# Patient Record
Sex: FEMALE | Race: WHITE | NOT HISPANIC OR LATINO | Employment: FULL TIME | URBAN - METROPOLITAN AREA
[De-identification: names, ages, dates, MRNs, and addresses within clinical notes are randomized per-mention and may not be internally consistent; named-entity substitution may affect disease eponyms.]

---

## 2017-05-26 ENCOUNTER — HOSPITAL ENCOUNTER (INPATIENT)
Facility: HOSPITAL | Age: 62
LOS: 1 days | Discharge: HOME | DRG: 074 | End: 2017-05-27
Attending: EMERGENCY MEDICINE | Admitting: FAMILY MEDICINE
Payer: COMMERCIAL

## 2017-05-26 DIAGNOSIS — R10.9 ABDOMINAL PAIN: Primary | ICD-10-CM

## 2017-05-26 DIAGNOSIS — I15.2 HYPERTENSION ASSOCIATED WITH DIABETES: ICD-10-CM

## 2017-05-26 DIAGNOSIS — R50.9 FEVER, UNSPECIFIED FEVER CAUSE: ICD-10-CM

## 2017-05-26 DIAGNOSIS — E11.59 HYPERTENSION ASSOCIATED WITH DIABETES: ICD-10-CM

## 2017-05-26 DIAGNOSIS — I10 BENIGN ESSENTIAL HTN: ICD-10-CM

## 2017-05-26 DIAGNOSIS — R11.2 INTRACTABLE VOMITING WITH NAUSEA, UNSPECIFIED VOMITING TYPE: ICD-10-CM

## 2017-05-26 DIAGNOSIS — E11.65 TYPE 2 DIABETES MELLITUS WITH HYPERGLYCEMIA, WITHOUT LONG-TERM CURRENT USE OF INSULIN: ICD-10-CM

## 2017-05-26 PROBLEM — E11.10 DIABETIC KETOACIDOSIS ASSOCIATED WITH TYPE 2 DIABETES MELLITUS: Status: ACTIVE | Noted: 2017-05-26

## 2017-05-26 PROBLEM — E78.5 HLD (HYPERLIPIDEMIA): Status: ACTIVE | Noted: 2017-05-26

## 2017-05-26 PROBLEM — E11.9 DM (DIABETES MELLITUS), TYPE 2: Status: ACTIVE | Noted: 2017-05-26

## 2017-05-26 LAB
ALBUMIN SERPL BCP-MCNC: 4.3 G/DL
ALP SERPL-CCNC: 49 U/L
ALT SERPL W/O P-5'-P-CCNC: 32 U/L
AMPHET+METHAMPHET UR QL: NEGATIVE
AMYLASE SERPL-CCNC: 66 U/L
ANION GAP SERPL CALC-SCNC: 13 MMOL/L
AST SERPL-CCNC: 27 U/L
BARBITURATES UR QL SCN>200 NG/ML: NEGATIVE
BASOPHILS # BLD AUTO: 0.02 K/UL
BASOPHILS NFR BLD: 0.3 %
BENZODIAZ UR QL SCN>200 NG/ML: NEGATIVE
BILIRUB SERPL-MCNC: 0.4 MG/DL
BILIRUB UR QL STRIP: NEGATIVE
BUN SERPL-MCNC: 21 MG/DL
BZE UR QL SCN: NEGATIVE
CALCIUM SERPL-MCNC: 9.8 MG/DL
CANNABINOIDS UR QL SCN: NEGATIVE
CHLORIDE SERPL-SCNC: 104 MMOL/L
CK SERPL-CCNC: 104 U/L
CLARITY UR: CLEAR
CO2 SERPL-SCNC: 25 MMOL/L
COLOR UR: YELLOW
CREAT SERPL-MCNC: 0.7 MG/DL
CREAT UR-MCNC: 38.3 MG/DL
DIFFERENTIAL METHOD: ABNORMAL
EOSINOPHIL # BLD AUTO: 0 K/UL
EOSINOPHIL NFR BLD: 0.1 %
ERYTHROCYTE [DISTWIDTH] IN BLOOD BY AUTOMATED COUNT: 12.8 %
EST. GFR  (AFRICAN AMERICAN): >60 ML/MIN/1.73 M^2
EST. GFR  (NON AFRICAN AMERICAN): >60 ML/MIN/1.73 M^2
GLUCOSE SERPL-MCNC: 197 MG/DL
GLUCOSE UR QL STRIP: ABNORMAL
HCT VFR BLD AUTO: 42.1 %
HGB BLD-MCNC: 14 G/DL
HGB UR QL STRIP: NEGATIVE
KETONES UR QL STRIP: NEGATIVE
LEUKOCYTE ESTERASE UR QL STRIP: NEGATIVE
LIPASE SERPL-CCNC: 60 U/L
LYMPHOCYTES # BLD AUTO: 1.3 K/UL
LYMPHOCYTES NFR BLD: 18.2 %
MAGNESIUM SERPL-MCNC: 1.7 MG/DL
MCH RBC QN AUTO: 28.1 PG
MCHC RBC AUTO-ENTMCNC: 33.3 %
MCV RBC AUTO: 84 FL
METHADONE UR QL SCN>300 NG/ML: NEGATIVE
MONOCYTES # BLD AUTO: 0.2 K/UL
MONOCYTES NFR BLD: 2.4 %
NEUTROPHILS # BLD AUTO: 5.5 K/UL
NEUTROPHILS NFR BLD: 78.7 %
NITRITE UR QL STRIP: NEGATIVE
OPIATES UR QL SCN: NEGATIVE
PCP UR QL SCN>25 NG/ML: NEGATIVE
PH UR STRIP: >8 [PH] (ref 5–8)
PLATELET # BLD AUTO: 402 K/UL
PMV BLD AUTO: 8.6 FL
POCT GLUCOSE: 166 MG/DL (ref 70–110)
POTASSIUM SERPL-SCNC: 3.7 MMOL/L
PROT SERPL-MCNC: 7.4 G/DL
PROT UR QL STRIP: NEGATIVE
RBC # BLD AUTO: 4.99 M/UL
SODIUM SERPL-SCNC: 142 MMOL/L
SP GR UR STRIP: 1.01 (ref 1–1.03)
TOXICOLOGY INFORMATION: NORMAL
TROPONIN I SERPL DL<=0.01 NG/ML-MCNC: <0.006 NG/ML
URN SPEC COLLECT METH UR: ABNORMAL
UROBILINOGEN UR STRIP-ACNC: NEGATIVE EU/DL
WBC # BLD AUTO: 7.04 K/UL

## 2017-05-26 PROCEDURE — 85025 COMPLETE CBC W/AUTO DIFF WBC: CPT

## 2017-05-26 PROCEDURE — 96365 THER/PROPH/DIAG IV INF INIT: CPT

## 2017-05-26 PROCEDURE — 99285 EMERGENCY DEPT VISIT HI MDM: CPT | Mod: 25

## 2017-05-26 PROCEDURE — 63600175 PHARM REV CODE 636 W HCPCS: Performed by: STUDENT IN AN ORGANIZED HEALTH CARE EDUCATION/TRAINING PROGRAM

## 2017-05-26 PROCEDURE — 25000003 PHARM REV CODE 250: Performed by: STUDENT IN AN ORGANIZED HEALTH CARE EDUCATION/TRAINING PROGRAM

## 2017-05-26 PROCEDURE — 11000001 HC ACUTE MED/SURG PRIVATE ROOM

## 2017-05-26 PROCEDURE — 83690 ASSAY OF LIPASE: CPT

## 2017-05-26 PROCEDURE — 82550 ASSAY OF CK (CPK): CPT

## 2017-05-26 PROCEDURE — 82570 ASSAY OF URINE CREATININE: CPT

## 2017-05-26 PROCEDURE — 83036 HEMOGLOBIN GLYCOSYLATED A1C: CPT

## 2017-05-26 PROCEDURE — 25000003 PHARM REV CODE 250: Performed by: EMERGENCY MEDICINE

## 2017-05-26 PROCEDURE — 83735 ASSAY OF MAGNESIUM: CPT

## 2017-05-26 PROCEDURE — 87040 BLOOD CULTURE FOR BACTERIA: CPT | Mod: 59

## 2017-05-26 PROCEDURE — 96361 HYDRATE IV INFUSION ADD-ON: CPT

## 2017-05-26 PROCEDURE — 96376 TX/PRO/DX INJ SAME DRUG ADON: CPT

## 2017-05-26 PROCEDURE — 80053 COMPREHEN METABOLIC PANEL: CPT

## 2017-05-26 PROCEDURE — 63600175 PHARM REV CODE 636 W HCPCS: Performed by: FAMILY MEDICINE

## 2017-05-26 PROCEDURE — 84484 ASSAY OF TROPONIN QUANT: CPT

## 2017-05-26 PROCEDURE — 82150 ASSAY OF AMYLASE: CPT

## 2017-05-26 PROCEDURE — 96375 TX/PRO/DX INJ NEW DRUG ADDON: CPT

## 2017-05-26 PROCEDURE — 63600175 PHARM REV CODE 636 W HCPCS: Performed by: EMERGENCY MEDICINE

## 2017-05-26 PROCEDURE — 81003 URINALYSIS AUTO W/O SCOPE: CPT

## 2017-05-26 PROCEDURE — 96366 THER/PROPH/DIAG IV INF ADDON: CPT

## 2017-05-26 PROCEDURE — 96372 THER/PROPH/DIAG INJ SC/IM: CPT

## 2017-05-26 PROCEDURE — 93005 ELECTROCARDIOGRAM TRACING: CPT

## 2017-05-26 PROCEDURE — 25000003 PHARM REV CODE 250: Performed by: FAMILY MEDICINE

## 2017-05-26 RX ORDER — SAXAGLIPTIN 5 MG/1
TABLET, FILM COATED ORAL DAILY
COMMUNITY
End: 2018-06-29

## 2017-05-26 RX ORDER — DICYCLOMINE HYDROCHLORIDE 10 MG/ML
20 INJECTION INTRAMUSCULAR
Status: COMPLETED | OUTPATIENT
Start: 2017-05-26 | End: 2017-05-26

## 2017-05-26 RX ORDER — METOPROLOL TARTRATE 1 MG/ML
5 INJECTION, SOLUTION INTRAVENOUS ONCE
Status: COMPLETED | OUTPATIENT
Start: 2017-05-26 | End: 2017-05-26

## 2017-05-26 RX ORDER — HYDRALAZINE HYDROCHLORIDE 20 MG/ML
10 INJECTION INTRAMUSCULAR; INTRAVENOUS EVERY 6 HOURS PRN
Status: DISCONTINUED | OUTPATIENT
Start: 2017-05-26 | End: 2017-05-27 | Stop reason: HOSPADM

## 2017-05-26 RX ORDER — GLUCAGON 1 MG
1 KIT INJECTION
Status: DISCONTINUED | OUTPATIENT
Start: 2017-05-26 | End: 2017-05-27 | Stop reason: HOSPADM

## 2017-05-26 RX ORDER — METFORMIN HYDROCHLORIDE 500 MG/1
500 TABLET ORAL 2 TIMES DAILY WITH MEALS
COMMUNITY
End: 2018-08-09

## 2017-05-26 RX ORDER — ONDANSETRON 4 MG/1
4 TABLET, ORALLY DISINTEGRATING ORAL EVERY 6 HOURS PRN
Qty: 15 TABLET | Refills: 0 | Status: SHIPPED | OUTPATIENT
Start: 2017-05-26 | End: 2017-05-27

## 2017-05-26 RX ORDER — HYDROMORPHONE HYDROCHLORIDE 1 MG/ML
0.5 INJECTION, SOLUTION INTRAMUSCULAR; INTRAVENOUS; SUBCUTANEOUS ONCE
Status: COMPLETED | OUTPATIENT
Start: 2017-05-26 | End: 2017-05-26

## 2017-05-26 RX ORDER — HYDROMORPHONE HYDROCHLORIDE 1 MG/ML
0.5 INJECTION, SOLUTION INTRAMUSCULAR; INTRAVENOUS; SUBCUTANEOUS
Status: COMPLETED | OUTPATIENT
Start: 2017-05-26 | End: 2017-05-26

## 2017-05-26 RX ORDER — MELOXICAM 7.5 MG/1
7.5 TABLET ORAL DAILY
Status: ON HOLD | COMMUNITY
End: 2017-05-27 | Stop reason: HOSPADM

## 2017-05-26 RX ORDER — INSULIN ASPART 100 [IU]/ML
0-5 INJECTION, SOLUTION INTRAVENOUS; SUBCUTANEOUS
Status: DISCONTINUED | OUTPATIENT
Start: 2017-05-26 | End: 2017-05-27 | Stop reason: HOSPADM

## 2017-05-26 RX ORDER — METOCLOPRAMIDE HYDROCHLORIDE 5 MG/ML
INJECTION INTRAMUSCULAR; INTRAVENOUS
Status: COMPLETED
Start: 2017-05-26 | End: 2017-05-26

## 2017-05-26 RX ORDER — METOCLOPRAMIDE HYDROCHLORIDE 5 MG/ML
10 INJECTION INTRAMUSCULAR; INTRAVENOUS
Status: COMPLETED | OUTPATIENT
Start: 2017-05-26 | End: 2017-05-26

## 2017-05-26 RX ORDER — ONDANSETRON 2 MG/ML
4 INJECTION INTRAMUSCULAR; INTRAVENOUS
Status: COMPLETED | OUTPATIENT
Start: 2017-05-26 | End: 2017-05-26

## 2017-05-26 RX ORDER — PANTOPRAZOLE SODIUM 40 MG/10ML
40 INJECTION, POWDER, LYOPHILIZED, FOR SOLUTION INTRAVENOUS DAILY
Status: DISCONTINUED | OUTPATIENT
Start: 2017-05-27 | End: 2017-05-27 | Stop reason: HOSPADM

## 2017-05-26 RX ORDER — DIPHENHYDRAMINE HYDROCHLORIDE 50 MG/ML
25 INJECTION INTRAMUSCULAR; INTRAVENOUS
Status: COMPLETED | OUTPATIENT
Start: 2017-05-26 | End: 2017-05-26

## 2017-05-26 RX ORDER — METOCLOPRAMIDE HYDROCHLORIDE 5 MG/ML
10 INJECTION INTRAMUSCULAR; INTRAVENOUS EVERY 6 HOURS
Status: DISCONTINUED | OUTPATIENT
Start: 2017-05-27 | End: 2017-05-27 | Stop reason: HOSPADM

## 2017-05-26 RX ORDER — ACETAMINOPHEN 325 MG/1
650 TABLET ORAL ONCE
Status: COMPLETED | OUTPATIENT
Start: 2017-05-26 | End: 2017-05-26

## 2017-05-26 RX ORDER — IBUPROFEN 200 MG
24 TABLET ORAL
Status: DISCONTINUED | OUTPATIENT
Start: 2017-05-26 | End: 2017-05-27 | Stop reason: HOSPADM

## 2017-05-26 RX ORDER — PRAVASTATIN SODIUM 40 MG/1
40 TABLET ORAL DAILY
COMMUNITY

## 2017-05-26 RX ORDER — FENOFIBRIC ACID 135 MG/1
135 CAPSULE, DELAYED RELEASE ORAL DAILY
COMMUNITY

## 2017-05-26 RX ORDER — ENOXAPARIN SODIUM 100 MG/ML
40 INJECTION SUBCUTANEOUS EVERY 24 HOURS
Status: DISCONTINUED | OUTPATIENT
Start: 2017-05-26 | End: 2017-05-27 | Stop reason: HOSPADM

## 2017-05-26 RX ORDER — IBUPROFEN 200 MG
16 TABLET ORAL
Status: DISCONTINUED | OUTPATIENT
Start: 2017-05-26 | End: 2017-05-27 | Stop reason: HOSPADM

## 2017-05-26 RX ORDER — DEXTROSE MONOHYDRATE AND SODIUM CHLORIDE 5; .45 G/100ML; G/100ML
INJECTION, SOLUTION INTRAVENOUS CONTINUOUS
Status: DISCONTINUED | OUTPATIENT
Start: 2017-05-26 | End: 2017-05-27 | Stop reason: HOSPADM

## 2017-05-26 RX ORDER — LISINOPRIL 10 MG/1
10 TABLET ORAL DAILY
COMMUNITY

## 2017-05-26 RX ORDER — PROMETHAZINE HYDROCHLORIDE 25 MG/1
25 SUPPOSITORY RECTAL EVERY 6 HOURS PRN
Qty: 12 SUPPOSITORY | Refills: 0 | Status: SHIPPED | OUTPATIENT
Start: 2017-05-26 | End: 2018-06-29

## 2017-05-26 RX ORDER — ONDANSETRON 8 MG/1
8 TABLET, ORALLY DISINTEGRATING ORAL EVERY 8 HOURS PRN
Status: DISCONTINUED | OUTPATIENT
Start: 2017-05-26 | End: 2017-05-27 | Stop reason: HOSPADM

## 2017-05-26 RX ADMIN — HYDROMORPHONE HYDROCHLORIDE 0.5 MG: 1 INJECTION, SOLUTION INTRAMUSCULAR; INTRAVENOUS; SUBCUTANEOUS at 07:05

## 2017-05-26 RX ADMIN — SODIUM CHLORIDE 1000 ML: 0.9 INJECTION, SOLUTION INTRAVENOUS at 12:05

## 2017-05-26 RX ADMIN — METOPROLOL TARTRATE 5 MG: 5 INJECTION INTRAVENOUS at 07:05

## 2017-05-26 RX ADMIN — SODIUM CHLORIDE 1000 ML: 0.9 INJECTION, SOLUTION INTRAVENOUS at 08:05

## 2017-05-26 RX ADMIN — ACETAMINOPHEN 650 MG: 325 TABLET ORAL at 09:05

## 2017-05-26 RX ADMIN — ENOXAPARIN SODIUM 40 MG: 100 INJECTION SUBCUTANEOUS at 09:05

## 2017-05-26 RX ADMIN — METOCLOPRAMIDE 10 MG: 5 INJECTION, SOLUTION INTRAMUSCULAR; INTRAVENOUS at 02:05

## 2017-05-26 RX ADMIN — DICYCLOMINE HYDROCHLORIDE 20 MG: 20 INJECTION, SOLUTION INTRAMUSCULAR at 12:05

## 2017-05-26 RX ADMIN — METOCLOPRAMIDE 10 MG: 5 INJECTION, SOLUTION INTRAMUSCULAR; INTRAVENOUS at 11:05

## 2017-05-26 RX ADMIN — DIPHENHYDRAMINE HYDROCHLORIDE 25 MG: 50 INJECTION, SOLUTION INTRAMUSCULAR; INTRAVENOUS at 02:05

## 2017-05-26 RX ADMIN — PROMETHAZINE HYDROCHLORIDE 12.5 MG: 25 INJECTION INTRAMUSCULAR; INTRAVENOUS at 08:05

## 2017-05-26 RX ADMIN — METOCLOPRAMIDE 10 MG: 5 INJECTION, SOLUTION INTRAMUSCULAR; INTRAVENOUS at 07:05

## 2017-05-26 RX ADMIN — HYDROMORPHONE HYDROCHLORIDE 0.5 MG: 1 INJECTION, SOLUTION INTRAMUSCULAR; INTRAVENOUS; SUBCUTANEOUS at 09:05

## 2017-05-26 RX ADMIN — ONDANSETRON 4 MG: 2 INJECTION INTRAMUSCULAR; INTRAVENOUS at 08:05

## 2017-05-26 RX ADMIN — ONDANSETRON 4 MG: 2 INJECTION INTRAMUSCULAR; INTRAVENOUS at 10:05

## 2017-05-26 RX ADMIN — ONDANSETRON 8 MG: 8 TABLET, ORALLY DISINTEGRATING ORAL at 09:05

## 2017-05-26 RX ADMIN — PROMETHAZINE HYDROCHLORIDE 12.5 MG: 25 INJECTION INTRAMUSCULAR; INTRAVENOUS at 12:05

## 2017-05-26 RX ADMIN — HYDROMORPHONE HYDROCHLORIDE 0.5 MG: 1 INJECTION, SOLUTION INTRAMUSCULAR; INTRAVENOUS; SUBCUTANEOUS at 08:05

## 2017-05-26 RX ADMIN — DEXTROSE AND SODIUM CHLORIDE: 5; .45 INJECTION, SOLUTION INTRAVENOUS at 09:05

## 2017-05-26 NOTE — ED PROVIDER NOTES
Encounter Date: 5/26/2017       History     Chief Complaint   Patient presents with    Vomiting     abdominal pain; states began 10pm last night;      Review of patient's allergies indicates:  No Known Allergies  The history is provided by the patient and the spouse.   Emesis    This is a new problem. Illness onset: last night. The problem has been unchanged. The emesis has an appearance of stomach contents. Associated symptoms include abdominal pain. Pertinent negatives include no diarrhea and no fever.     Past Medical History:   Diagnosis Date    Diabetes mellitus     Hypertension      History reviewed. No pertinent surgical history.  History reviewed. No pertinent family history.  Social History   Substance Use Topics    Smoking status: Former Smoker    Smokeless tobacco: Never Used    Alcohol use No     Review of Systems   Constitutional: Positive for fatigue. Negative for fever.   Respiratory: Negative for shortness of breath.    Cardiovascular: Negative for chest pain.   Gastrointestinal: Positive for abdominal pain and vomiting. Negative for diarrhea.   Neurological: Positive for weakness. Negative for syncope.   All other systems reviewed and are negative.      Physical Exam     Initial Vitals [05/26/17 0747]   BP Pulse Resp Temp SpO2   (!) 206/84 69 20 98 °F (36.7 °C) 96 %     Physical Exam    Nursing note and vitals reviewed.  Constitutional: She appears distressed.   HENT:   Head: Normocephalic and atraumatic.   Dry mucous membranes.   Eyes: EOM are normal.   Neck: Normal range of motion. Neck supple.   Cardiovascular: Normal rate, regular rhythm and normal heart sounds.   Pulmonary/Chest: Breath sounds normal.   Abdominal: Soft.   Midepigastric tenderness without guarding or rebound.  Bowel sounds are normal.   Musculoskeletal: Normal range of motion. She exhibits no edema.   Neurological: She is alert and oriented to person, place, and time.   Skin: Skin is warm and dry.   Psychiatric: Her  behavior is normal. Thought content normal.         ED Course   Procedures  Labs Reviewed   CBC W/ AUTO DIFFERENTIAL - Abnormal; Notable for the following:        Result Value    Platelets 402 (*)     MPV 8.6 (*)     Mono # 0.2 (*)     Gran% 78.7 (*)     Mono% 2.4 (*)     All other components within normal limits   COMPREHENSIVE METABOLIC PANEL - Abnormal; Notable for the following:     Glucose 197 (*)     Alkaline Phosphatase 49 (*)     All other components within normal limits   URINALYSIS - Abnormal; Notable for the following:     pH, UA >8.0 (*)     Glucose, UA 2+ (*)     All other components within normal limits   AMYLASE   LIPASE   MAGNESIUM   CK   TROPONIN I     Imaging Results          CT Abdomen Pelvis With Contrast (No Result on File)                X-Ray Abdomen Flat And Erect (Final result)  Result time 05/26/17 08:45:27    Final result by Dagoberto Chapman MD (05/26/17 08:45:27)                 Impression:         1.  No evidence of bowel obstruction.      Electronically signed by: DAGOBERTO CHAPMAN MD  Date:     05/26/17  Time:    08:45              Narrative:    Abdomen series.    Comparison: None    Results: Flat and erect views.  Moderate amount of retained stool.  No unusual intestinal gas pattern in particular no evidence of bowel obstruction or ileus.  The soft tissues show no organomegaly or masses.  No unusual calcifications.   The skeletal structures show  nothing unusual for age, gender, or habitus .  No free air is seen.  The lung bases are clear.                             X-Ray Chest PA And Lateral (Final result)  Result time 05/26/17 08:45:43    Final result by Dagoberto Chapman MD (05/26/17 08:45:43)                 Impression:      No abnormality seen.      Electronically signed by: DAGOBERTO CHAPMAN MD  Date:     05/26/17  Time:    08:45              Narrative:    PA AND LATERAL CHEST    Comparison: None    Results: PA and lateral views of the chest.The lung volume appears  adequate.  No parenchymal or pleural abnormality seen.  The cardiac silhouette and pulmonary vascularity appear within normal limits.  No evidence of hilar lymph node enlargement.  No accumulation of pleural fluid or pneumothorax.  The osseous structures appear within normal limits.                                                        ED Course     Clinical Impression:   The primary encounter diagnosis was Non-intractable vomiting with nausea, unspecified vomiting type. A diagnosis of Abdominal pain was also pertinent to this visit.          Jerry Gutierrez MD  05/26/17 8739

## 2017-05-26 NOTE — H&P
[facility]  History & Physical    SUBJECTIVE:     Chief Complaint/Reason for Admission:  Intractable nausea and vomiting likely 2/2 gastroparesis     History of Present Illness:  Patient is a 61 y.o. female with PMH of HTN, DM, HLD, GERD, Throat cancer s/p radiation (completed 4 years ago) presents with abdominal pain, Nausea and vomiting x 18 hours. She reports about 10-15 episodes of non-bloody, non-bilious vomiting.  Patient states that she has had similar episodes of vomiting in the past and has been worked up at  with EGD, colonoscopy and capsule cameras.   She denies sick contacts, changes to diet. She denies diarrhea or constipation, chest pain or sob. She endorses subjective fever and chills since this AM.     IN ED, KUB no obstruction, CXR no acute process, CT ab pending. Trop, LA, amylase, lipase all wnl. Tmax 100.6. Pt received dicycolmine, benedryl, reglan, zofran, phenegran.       Current Outpatient Prescriptions:     lisinopril 10 MG tablet, Take 10 mg by mouth once daily., Disp: , Rfl:     metformin (GLUCOPHAGE) 500 MG tablet, Take 500 mg by mouth 2 (two) times daily with meals., Disp: , Rfl:     ondansetron (ZOFRAN-ODT) 4 MG TbDL, Take 1 tablet (4 mg total) by mouth every 6 (six) hours as needed (nausea or vomiting)., Disp: 15 tablet, Rfl: 0    promethazine (PHENERGAN) 25 MG suppository, Place 1 suppository (25 mg total) rectally every 6 (six) hours as needed for Nausea., Disp: 12 suppository, Rfl: 0    Review of patient's allergies indicates:  No Known Allergies    Past Medical History:   Diagnosis Date    Diabetes mellitus     Hypertension     Throat cancer      History reviewed. No pertinent surgical history.  History reviewed. No pertinent family history.  Social History   Substance Use Topics    Smoking status: Former Smoker    Smokeless tobacco: Never Used    Alcohol use No        Review of Systems:  Review of Systems   Constitutional: Positive for chills, fever  and malaise/fatigue. Negative for diaphoresis and weight loss.   HENT: Negative for congestion and sore throat.    Respiratory: Negative for cough and shortness of breath.    Cardiovascular: Negative for chest pain and palpitations.   Gastrointestinal: Positive for abdominal pain, nausea and vomiting. Negative for blood in stool, constipation and diarrhea.   Genitourinary: Negative for dysuria and frequency.   Musculoskeletal: Negative for back pain, myalgias and neck pain.   Neurological: Negative.  Negative for weakness and headaches.   Psychiatric/Behavioral: Negative.          OBJECTIVE:     Vital Signs (Most Recent):  Temp: (!) 100.6 °F (38.1 °C) (05/26/17 1635)  Pulse: 83 (05/26/17 1635)  Resp: 18 (05/26/17 1635)  BP: (!) 183/77 (05/26/17 1635)  SpO2: 96 % (05/26/17 1635)    Physical Exam:  Physical Exam   Constitutional: She is oriented to person, place, and time. She appears well-developed and well-nourished. No distress.   HENT:   Head: Normocephalic and atraumatic.   Mouth/Throat: No oropharyngeal exudate.   Eyes: Conjunctivae and EOM are normal.   Neck: Normal range of motion. Neck supple.   Cardiovascular: Normal rate, regular rhythm, normal heart sounds and intact distal pulses.    Pulmonary/Chest: Effort normal and breath sounds normal. No respiratory distress. She has no wheezes.   Abdominal: Soft. Bowel sounds are normal. She exhibits no distension. There is tenderness (mildly diffuse in all quandrants). There is no rebound.   Musculoskeletal: Normal range of motion. She exhibits no edema or deformity.   Lymphadenopathy:     She has no cervical adenopathy.   Neurological: She is alert and oriented to person, place, and time.   Skin: Skin is dry. She is not diaphoretic.   Nursing note and vitals reviewed.      Laboratory  LABS  CBC    Recent Labs  Lab 05/26/17  0806   WBC 7.04   RBC 4.99   HGB 14.0   HCT 42.1   *   MCV 84   MCH 28.1   MCHC 33.3     BMP    Recent Labs  Lab 05/26/17  0806   NA  142   K 3.7   CO2 25      BUN 21   CREATININE 0.7   *       Recent Labs  Lab 05/26/17  0806   CALCIUM 9.8   MG 1.7     LFT    Recent Labs  Lab 05/26/17  0806   PROT 7.4   ALBUMIN 4.3   BILITOT 0.4   AST 27   ALKPHOS 49*   ALT 32   CE    Recent Labs  Lab 05/26/17  0806   TROPONINI <0.006     UA    Recent Labs  Lab 05/26/17  1027   COLORU Yellow   SPECGRAV 1.015   PHUR >8.0*   PROTEINUA Negative     LAST HbA1c  No results found for: HGBA1C      Diagnostic Results:  Labs: Reviewed  ECG: Reviewed  X-Ray: Reviewed    ASSESSMENT/PLAN:     Patient is a 61 y.o. female with PMH of HTN, DM, HLD, GERD, Throat cancer s/p radiation (completed 4 years ago) admitted for intractable n/v likely 2/2 diabetic gastroparesis.     Plan: Will admit for observation to [facility].    Intractable Nausea and vomiting  - likely 2/2 gastroparesis but also considering gastroenteritis, abdominal migraine, less likely sbo given kub and bm yesterday.   - Will attempt to obtain medical records from [facility] scheduled  - Prn zofran  - KUB : no obstruction  - CT ab/pelvis pending  - IV fluids    Fever  - Tmax 100.6  - ua, ucx, blood cx pending  - CXR no acute process  - CT pending.     HTN  - -206/ 76-84   - Home medications include lisinopril and metoprol but patient can not remember full medication list, Her  will bring in list today  - Hydralazine prn SBP >180  - Will plan to Continue home medications once able to tolerate diet and medication list obtained.     DM  - A1c pending  -  on admission  - SSI  - POCT glucose    Code: full  Diet: npo  Ppx: lovenox, protonix  Dispo: pending     Brianne Solorio D.O.  [facility] HO-1  05/26/2017

## 2017-05-26 NOTE — ED TRIAGE NOTES
Pt presents with onset of vomiting after 10pm last night and continuous since then, now with generalized abdominal pain.  Reports hx of throat cancer.

## 2017-05-26 NOTE — ED NOTES
Patient continues nauseated with abdominal discomfort of 8/10.  Vitals given to Dr. Gutierrez and MD at bedside.  Patient will stay in hospital as recommended by physician.

## 2017-05-27 VITALS
DIASTOLIC BLOOD PRESSURE: 67 MMHG | WEIGHT: 250 LBS | HEIGHT: 65 IN | BODY MASS INDEX: 41.65 KG/M2 | SYSTOLIC BLOOD PRESSURE: 145 MMHG | HEART RATE: 60 BPM | OXYGEN SATURATION: 98 % | TEMPERATURE: 97 F | RESPIRATION RATE: 18 BRPM

## 2017-05-27 LAB
ALBUMIN SERPL BCP-MCNC: 3.7 G/DL
ALP SERPL-CCNC: 39 U/L
ALT SERPL W/O P-5'-P-CCNC: 23 U/L
ANION GAP SERPL CALC-SCNC: 8 MMOL/L
AST SERPL-CCNC: 18 U/L
BASOPHILS # BLD AUTO: 0.01 K/UL
BASOPHILS NFR BLD: 0.1 %
BILIRUB SERPL-MCNC: 0.6 MG/DL
BUN SERPL-MCNC: 14 MG/DL
CALCIUM SERPL-MCNC: 8.6 MG/DL
CHLORIDE SERPL-SCNC: 106 MMOL/L
CO2 SERPL-SCNC: 25 MMOL/L
CREAT SERPL-MCNC: 0.8 MG/DL
DIFFERENTIAL METHOD: ABNORMAL
EOSINOPHIL # BLD AUTO: 0 K/UL
EOSINOPHIL NFR BLD: 0.1 %
ERYTHROCYTE [DISTWIDTH] IN BLOOD BY AUTOMATED COUNT: 13 %
EST. GFR  (AFRICAN AMERICAN): >60 ML/MIN/1.73 M^2
EST. GFR  (NON AFRICAN AMERICAN): >60 ML/MIN/1.73 M^2
ESTIMATED AVG GLUCOSE: 137 MG/DL
GLUCOSE SERPL-MCNC: 215 MG/DL
HBA1C MFR BLD HPLC: 6.4 %
HCT VFR BLD AUTO: 39.1 %
HGB BLD-MCNC: 13 G/DL
LYMPHOCYTES # BLD AUTO: 1.6 K/UL
LYMPHOCYTES NFR BLD: 20.3 %
MAGNESIUM SERPL-MCNC: 1.6 MG/DL
MCH RBC QN AUTO: 27.8 PG
MCHC RBC AUTO-ENTMCNC: 33.2 %
MCV RBC AUTO: 84 FL
MONOCYTES # BLD AUTO: 0.9 K/UL
MONOCYTES NFR BLD: 11.4 %
NEUTROPHILS # BLD AUTO: 5.4 K/UL
NEUTROPHILS NFR BLD: 68 %
PHOSPHATE SERPL-MCNC: 2 MG/DL
PLATELET # BLD AUTO: 390 K/UL
PMV BLD AUTO: 8.5 FL
POCT GLUCOSE: 168 MG/DL (ref 70–110)
POCT GLUCOSE: 196 MG/DL (ref 70–110)
POTASSIUM SERPL-SCNC: 3.5 MMOL/L
PROT SERPL-MCNC: 6.4 G/DL
RBC # BLD AUTO: 4.67 M/UL
SODIUM SERPL-SCNC: 139 MMOL/L
WBC # BLD AUTO: 7.92 K/UL

## 2017-05-27 PROCEDURE — 80053 COMPREHEN METABOLIC PANEL: CPT

## 2017-05-27 PROCEDURE — 63600175 PHARM REV CODE 636 W HCPCS: Performed by: STUDENT IN AN ORGANIZED HEALTH CARE EDUCATION/TRAINING PROGRAM

## 2017-05-27 PROCEDURE — 99900038 HC OT GENERIC THERAPY SCREENING (STAT)

## 2017-05-27 PROCEDURE — 85025 COMPLETE CBC W/AUTO DIFF WBC: CPT

## 2017-05-27 PROCEDURE — 25000003 PHARM REV CODE 250: Performed by: STUDENT IN AN ORGANIZED HEALTH CARE EDUCATION/TRAINING PROGRAM

## 2017-05-27 PROCEDURE — 84100 ASSAY OF PHOSPHORUS: CPT

## 2017-05-27 PROCEDURE — 83735 ASSAY OF MAGNESIUM: CPT

## 2017-05-27 PROCEDURE — 36415 COLL VENOUS BLD VENIPUNCTURE: CPT

## 2017-05-27 PROCEDURE — C9113 INJ PANTOPRAZOLE SODIUM, VIA: HCPCS | Performed by: STUDENT IN AN ORGANIZED HEALTH CARE EDUCATION/TRAINING PROGRAM

## 2017-05-27 PROCEDURE — 97161 PT EVAL LOW COMPLEX 20 MIN: CPT

## 2017-05-27 RX ORDER — ONDANSETRON 4 MG/1
4 TABLET, ORALLY DISINTEGRATING ORAL EVERY 6 HOURS PRN
Qty: 40 TABLET | Refills: 0 | Status: SHIPPED | OUTPATIENT
Start: 2017-05-27 | End: 2017-06-06

## 2017-05-27 RX ORDER — PROMETHAZINE HYDROCHLORIDE 12.5 MG/1
12.5 TABLET ORAL EVERY 6 HOURS PRN
Qty: 40 TABLET | Refills: 0 | Status: SHIPPED | OUTPATIENT
Start: 2017-05-27 | End: 2018-06-29

## 2017-05-27 RX ADMIN — METOCLOPRAMIDE 10 MG: 5 INJECTION, SOLUTION INTRAMUSCULAR; INTRAVENOUS at 11:05

## 2017-05-27 RX ADMIN — DEXTROSE AND SODIUM CHLORIDE: 5; .45 INJECTION, SOLUTION INTRAVENOUS at 11:05

## 2017-05-27 RX ADMIN — PANTOPRAZOLE SODIUM 40 MG: 40 INJECTION, POWDER, FOR SOLUTION INTRAVENOUS at 08:05

## 2017-05-27 RX ADMIN — METOCLOPRAMIDE 10 MG: 5 INJECTION, SOLUTION INTRAMUSCULAR; INTRAVENOUS at 05:05

## 2017-05-27 NOTE — PLAN OF CARE
Discharge orders noted, no HH or HME ordered.       05/27/17 1444   Final Note   Assessment Type Final Discharge Note   Discharge Disposition Home   What phone number can be called within the next 1-3 days to see how you are doing after discharge? [phone]   Hospital Follow Up  Appt(s) scheduled? No  (office closed, [place] to follow up)   Offered [facility] -- Bedside Delivery? n/a   Discharge/Hospital Encounter Summary to (non-[facility]) PCP Yes   Referral to Outpatient Case Management complete? n/a   Referral to / orders for Home Health Complete? n/a   30 day supply of medicines given at discharge, if documented non-compliance / non-adherence? n/a   Any social issues identified prior to discharge? n/a   Did you assess the readiness or willingness of the family or caregiver to support self management of care? n/a   Right Care Referral Info   Post Acute Recommendation No Care     Ni Vang, RN Transitional Navigator  [phone]

## 2017-05-27 NOTE — PROGRESS NOTES
PGY-1 Progress Note    Hospital Stay Day 1    Patient is 61 y.o. year old female with PMH of HTN, DM, HLD, GERD, Throat cancer s/p radiation (completed 4 years ago) admitted for intractable n/v.     Subjective: Patient seen and examined. Patient states that she is feeling much improved. No episodes of n/v since ED. Tolerating diet. BM this AM.  Denies any CP, SOB, headaches, fever or chills.     Scheduled Meds:   enoxaparin  40 mg Subcutaneous Daily    metoclopramide HCl  10 mg Intravenous Q6H    pantoprazole  40 mg Intravenous Daily     Continuous Infusions:   dextrose 5 % and 0.45 % NaCl 125 mL/hr at 17 1159     PRN Meds:dextrose 50%, dextrose 50%, glucagon (human recombinant), glucose, glucose, hydrALAZINE, insulin aspart, ondansetron    Review of patient's allergies indicates:  No Known Allergies    Objectives:     Vitals(Most Recent)      BP  Min: 119/57  Max: 183/77  Temp  Av.1 °F (37.3 °C)  Min: 97.2 °F (36.2 °C)  Max: 100.6 °F (38.1 °C)  Pulse  Av.2  Min: 60  Max: 83  Resp  Av.1  Min: 18  Max: 19  SpO2  Av %  Min: 94 %  Max: 98 %             Vitals(Vnli40d)  Temp:  [97.2 °F (36.2 °C)-100.6 °F (38.1 °C)]   Pulse:  [60-83]   Resp:  [18-19]   BP: (119-183)/(57-80)   SpO2:  [94 %-98 %]     I & O(Ouur54t)    Intake/Output Summary (Last 24 hours) at 17 1351  Last data filed at 17 0500   Gross per 24 hour   Intake           970.83 ml   Output                0 ml   Net           970.83 ml       General: AAOx3. NAD.  HEENT: NCAT. PERRLA. EOMI.   Neck: Supple. No JVD. No LAD.    CV: RRR. NL S1/S2. No M/R/G.   Chest: NL effort. CTAB. No R/R/W.   Abd: +BS x 4. Soft. ND/NT. No rebound or guarding.   Ext: No C/C/E. Peripheral pulses intact. NL ROM.   Skin: Intact. No rash. No lesions.   Neuro: CN II-XII intact. No focal deficit. Strength 5/5 throughout. Sensation intact.   Psych: Good judgement and reason. No A/V hallucinations. NL affect. No abnormal behaviors  noted    LABS  CBC    Recent Labs  Lab 05/26/17  0806 05/27/17  0859   WBC 7.04 7.92   RBC 4.99 4.67   HGB 14.0 13.0   HCT 42.1 39.1   * 390*   MCV 84 84   MCH 28.1 27.8   MCHC 33.3 33.2     BMP    Recent Labs  Lab 05/26/17  0806 05/27/17  0859    139   K 3.7 3.5   CO2 25 25    106   BUN 21 14   CREATININE 0.7 0.8   * 215*       POCT-Glucose  POCT Glucose   Date Value Ref Range Status   05/27/2017 196 (H) 70 - 110 mg/dL Final   05/27/2017 168 (H) 70 - 110 mg/dL Final   05/26/2017 166 (H) 70 - 110 mg/dL Final         Recent Labs  Lab 05/26/17  0806 05/27/17  0859   CALCIUM 9.8 8.6*   MG 1.7 1.6   PHOS  --  2.0*     LFT    Recent Labs  Lab 05/26/17  0806 05/27/17  0859   PROT 7.4 6.4   ALBUMIN 4.3 3.7   BILITOT 0.4 0.6   AST 27 18   ALKPHOS 49* 39*   ALT 32 23     CE    Recent Labs  Lab 05/26/17  0806   TROPONINI <0.006     LAST HbA1c  Lab Results   Component Value Date    HGBA1C 6.4 (H) 05/26/2017       Imaging  Imaging Results          CT Abdomen Pelvis  Without Contrast (Final result)  Result time 05/26/17 20:16:35    Final result by Caesar Woods MD (05/26/17 20:16:35)                 Impression:         Fatty infiltration of liver.        Electronically signed by: Dr. Caesar Woods MD  Date:     05/26/17  Time:    20:16              Narrative:    CT OF THE ABDOMEN & PELVIS WITHOUT CONTRAST.    Technique:  Using helical CT technique, 5 mm axial images of the abdomen and pelvis were obtained from the lung bases through the ischial tuberosities.  No IV contrast performed as per request; lack of intravenous contrast, while optimal for detection of renal calculi, limits detection of parenchymal organ disease.     Comparison: None.     Findings:     ABDOMEN: Note that non-contrast CT is relatively insensitive for evaluation of the solid organs.   - Lung bases: No infiltrates and no nodules.   - Liver: Diffuse fatty infiltration of liver.   - Gallbladder: No calcified gallstones.    -  Bile Ducts: No evidence of dilated ducts.  - Spleen: Negative.  - Pancreas: Unremarkable.  - Adrenals: Unremarkable.  - Kidneys: No hydronephrosis or calculi.  - Retroperitoneum: No significant adenopathy.  - Vascular: No abdominal aortic aneurysm.   - Abdominal wall:  Unremarkable.     PELVIS:  No pelvic adenopathy, free fluid, or mass.    BOWEL/MESENTERY:   No evidence of bowel obstruction or inflammatory process. No CT evidence for appendicitis there is no evidence for inflammation in the RIGHT lower quadrant.  Appendix not directly visualized.      BONES: No acute bony abnormality or suspicious lytic or blastic lesion.                             X-Ray Abdomen Flat And Erect (Final result)  Result time 05/26/17 08:45:27    Final result by Dagoberto Chapman MD (05/26/17 08:45:27)                 Impression:         1.  No evidence of bowel obstruction.      Electronically signed by: DAGOBERTO CHAPMAN MD  Date:     05/26/17  Time:    08:45              Narrative:    Abdomen series.    Comparison: None    Results: Flat and erect views.  Moderate amount of retained stool.  No unusual intestinal gas pattern in particular no evidence of bowel obstruction or ileus.  The soft tissues show no organomegaly or masses.  No unusual calcifications.   The skeletal structures show  nothing unusual for age, gender, or habitus .  No free air is seen.  The lung bases are clear.                             X-Ray Chest PA And Lateral (Final result)  Result time 05/26/17 08:45:43    Final result by Dagoberto Chapman MD (05/26/17 08:45:43)                 Impression:      No abnormality seen.      Electronically signed by: DAGOBERTO CHAPMAN MD  Date:     05/26/17  Time:    08:45              Narrative:    PA AND LATERAL CHEST    Comparison: None    Results: PA and lateral views of the chest.The lung volume appears adequate.  No parenchymal or pleural abnormality seen.  The cardiac silhouette and pulmonary vascularity appear  within normal limits.  No evidence of hilar lymph node enlargement.  No accumulation of pleural fluid or pneumothorax.  The osseous structures appear within normal limits.                              Micro:  Microbiology Results (last 7 days)     Procedure Component Value Units Date/Time    Blood culture [[id number]] Collected:  05/26/17 1733    Order Status:  Completed Specimen:  Blood from AV Graft, Left Updated:  05/27/17 0315     Blood Culture, Routine No Growth to date    Blood culture [[id number]] Collected:  05/26/17 1734    Order Status:  Completed Specimen:  Blood from Line, Arterial, Right Updated:  05/27/17 0315     Blood Culture, Routine No Growth to date    Gram stain [[id number]]     Order Status:  No result Specimen:  Urine from Urine            Assessment/Plan: Patient is a 61 y.o. female with PMH of HTN, DM, HLD, GERD, Throat cancer s/p radiation (completed 4 years ago) admitted for intractable n/v likely 2/2 diabetic gastroparesis vs gastroenteritis.     Intractable Nausea and vomiting - Improved  - likely 2/2 gastroparesis but also considering gastroenteritis, abdominal migraine.   - Reglan scheduled  - Prn zofran  - KUB : no obstruction  - CT ab/pelvis : non-contributory   - IV fluids  - Tolerating diet of clear liquids.  Will advance today.      Fever  - Afebrile overnight  - ua, ucx, blood cx pending  - CXR no acute process   - CT no acute process     HTN  - BP stable  - Home medications include lisinopril and metoprol but patient can not remember full medication list, Her  will bring in list today  - Hydralazine prn SBP >180  - Will plan to Continue home medications once able to tolerate diet and medication list obtained.      DM  - A1c 6.4  - BG   -   - POCT glucose     Code: full  Diet: clear fluids will advance  Ppx: lovenox, protonix  Dispo: pending toleration of lunch    Brianne CASTANEDAU-FM  HO-1  05/27/2017

## 2017-05-27 NOTE — PLAN OF CARE
Problem: Patient Care Overview  Goal: Plan of Care Review  Outcome: Ongoing (interventions implemented as appropriate)  Patient resting in bed, AAOx4. Family at the bedside. IVF infusing as ordered. Medications administered as ordered. Patient complained of pain and nausea upon arrival to the floor resolved with PRN medications. Patient asleep for majority of shift. Encouraged to call with needs or concerns. Will continue to monitor.

## 2017-05-27 NOTE — PLAN OF CARE
Problem: Physical Therapy Goal  Goal: Physical Therapy Goal  Goals:  Pt to demonstrate independence and good safety with all functional mobility skills.     MET    Pt demonstrates independence with functional mobility at this time without c/o of dizziness or pain.  Recommend discharge to home with family support PRN.

## 2017-05-27 NOTE — PROGRESS NOTES
Pt discharged to home.  All discharge instructions regarding medications and follow up appointments discussed with patient.  Verbalized understanding.  Preferred to walk to lobby accompanied by son and daughter-in-law.  Safety maintained.

## 2017-05-27 NOTE — PT/OT/SLP EVAL
Physical Therapy  Evaluation and Discharge Summary    Radha Minaya   MRN: [id number]   Admitting Diagnosis: Nausea and vomiting    PT Received On: 05/27/17  PT Start Time: 1505     PT Stop Time: 1515    PT Total Time (min): 10 min       Billable Minutes:  Evaluation 10    Diagnosis: Nausea and vomiting      Past Medical History:   Diagnosis Date    Diabetes mellitus     Hypertension     Throat cancer       History reviewed. No pertinent surgical history.    General Precautions: Standard, fall  Orthopedic Precautions: N/A   Braces: N/A       Do you have any cultural, spiritual, Mosque conflicts, given your current situation?: none reported    Patient History:  Lives With: spouse (disabled )  Living Arrangements: house  Home Layout: Able to live on 1st floor  Stair Railings at Home: none  Living Environment Comment: pt lives w  in [facility] with 1 step entry, had tub/shower combo with no grab bars or equipment;  pt ind with ambulation and ADL; pt independent with IADL and drives, pt works in office environment  Equipment Currently Used at Home: none  DME owned (not currently used): none    Previous Level of Function:  Ambulation Skills: independent  Transfer Skills: independent  ADL Skills: independent  Work/Leisure Activity: independent (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    Chief Complaint:  No c/o pain or dizziness or nausea at time of eval  Patient goals: return to home    Pain/Comfort  Pain Rating 1: 0/10  Pain Rating Post-Intervention 1: 0/10      Objective:   Patient found with: peripheral IV     Cognitive Exam:  Oriented to: Person, Place, Time and Situation    Follows Commands/attention: Follows two-step commands  Communication: clear/fluent  Safety awareness/insight to disability: intact    Physical Exam:  Postural examination/scapula alignment: WFL    Skin integrity: Visible skin intact    Upper Extremity Range of  Motion:  Right Upper Extremity: WFL  Left Upper Extremity: WFL    Upper Extremity Strength:  Right Upper Extremity: WFL  Grossly 4+/5  Left Upper Extremity: WFL     Grossly 4+/5    Lower Extremity Range of Motion:  Right Lower Extremity: WFL    Left Lower Extremity: WFL         Lower Extremity Strength:  Right Lower Extremity: WFL   Grossly 5/5  Left Lower Extremity: WFL      Grossly 5/5     Fine motor coordination:  intact    Gross motor coordination: intact    Functional Mobility:  Bed Mobility:  Rolling/Turning to Left: Independent  Rolling/Turning Right: Independent  Scooting/Bridging: Independent  Supine to Sit: Independent  Sit to Supine: Independent    Transfers:  Sit <> Stand Assistance: Independent  Sit <> Stand Assistive Device: No Assistive Device    Gait:   Gait Distance: pt amb with SBA with assist w IV pole ~12' with no LOB without AD, pt perf high level balance gait assessment with step fwd/back/B sides and SLS B + ~4 seconds; pt reports no dizziness or nausea during mobility  Assistance 1: Stand by Assistance, Contact Guard Assistance  Gait Assistive Device: No device, Hand held assist  Gait Pattern: reciprocal  Gait Deviation(s): decreased bayron (initial decreased bayron and increased awareness/safety but WFL gait pattern)    Stairs:NT    Balance:   Static Sit: GOOD: Takes MODERATE challenges from all directions  Dynamic Sit: GOOD: Maintains balance through MODERATE excursions of active trunk movement  Static Stand: FAIR+: Takes MINIMAL challenges from all directions  Dynamic stand: GOOD+: Independent gait (with or without assistive device)    Therapeutic Activities and Exercises:  Education for safety with mobility and upon return to work and driving self monitoring for strength and endurance to be safe in environment which pt verbalized understanding and agreement, bed mobility training, txf training, balance activities, gait training    AM-PAC 6 CLICK MOBILITY  How much help from another  person does this patient currently need?   1 = Unable, Total/Dependent Assistance  2 = A lot, Maximum/Moderate Assistance  3 = A little, Minimum/Contact Guard/Supervision  4 = None, Modified [place]/Independent    Turning over in bed (including adjusting bedclothes, sheets and blankets)?: 4  Sitting down on and standing up from a chair with arms (e.g., wheelchair, bedside commode, etc.): 4  Moving from lying on back to sitting on the side of the bed?: 4  Moving to and from a bed to a chair (including a wheelchair)?: 4  Need to walk in hospital room?: 4  Climbing 3-5 steps with a railing?: 4  Total Score: 24     AM-PAC Raw Score CMS G-Code Modifier Level of Impairment Assistance   6 % Total / Unable   7 - 9 CM 80 - 100% Maximal Assist   10 - 14 CL 60 - 80% Moderate Assist   15 - 19 CK 40 - 60% Moderate Assist   20 - 22 CJ 20 - 40% Minimal Assist   23 CI 1-20% SBA / CGA   24 CH 0% Independent/ Mod I     Patient left seated EOB with all lines intact, call button in reach, nsg notified and family present.    Assessment:   Radha Minaya is a 61 y.o. female with a medical diagnosis of Nausea and vomiting and presents with decreased endurance/easily fatigued but independent with functional mobility at this time.    Rehab identified problem list/impairments: Rehab identified problem list/impairments: impaired endurance    Rehab potential is excellent.    Activity tolerance: Excellent    Discharge recommendations: Discharge Facility/Level Of Care Needs: home     Barriers to discharge: Barriers to Discharge: None    Equipment recommendations: Equipment Needed After Discharge: none     GOALS:    Physical Therapy Goals        Problem: Physical Therapy Goal    Goal Priority Disciplines Outcome Goal Variances Interventions   Physical Therapy Goal     PT/OT, PT      Description:  Goals:  Pt to demonstrate independence and good safety with all functional mobility skills.     MET                      PLAN:    Patient  to be seen   to address the above listed problems via    Plan of Care expires: 05/27/17  Plan of Care reviewed with: patient          Herlinda Kyleigh, PT  05/27/2017

## 2017-05-27 NOTE — PT/OT/SLP PROGRESS
OT SCREEN    Pt. Found seated EOB with family present.  Pt. Reports living with disabled spouse in  home with threshold entry, tub shower combo; no use of DME for bathing ADL.  Independent in ADL & functional ambulation.  Works in office, drives, & is R-handed.  Full AROM of BUE WNL.  Can touch toes in sitting/standing & back of head.  Reports dressing self with no difficulty this day.  No deficits noted.  No OT services needed at this time.      STEPHANIE Bowens

## 2017-05-27 NOTE — PLAN OF CARE
Patient presents with    Vomiting       abdominal pain; states began 10pm 5/25/17 05/27/17 0946   Discharge Assessment   Assessment Type Discharge Planning Assessment   Confirmed/corrected address and phone number on facesheet? Yes   Assessment information obtained from? Patient   Expected Length of Stay (days) 2   Communicated expected length of stay with patient/caregiver yes   Prior to hospitilization cognitive status: Alert/Oriented   Prior to hospitalization functional status: Independent   Current cognitive status: Alert/Oriented   Current Functional Status: Independent   Arrived From home or self-care   Lives With spouse   Able to Return to Prior Arrangements yes   Is patient able to care for self after discharge? Yes   How many people do you have in your home that can help with your care after discharge? 1   Who are your caregiver(s) and their phone number(s)? ginger Beto Rei  [phone]   Patient's perception of discharge disposition home or selfcare   Readmission Within The Last 30 Days no previous admission in last 30 days   Patient currently being followed by outpatient case management? No   Patient currently receives home health services? No   Does the patient currently use HME? No   Patient currently receives private duty nursing? No   Patient currently receives any other outside agency services? No   Equipment Currently Used at Home glucometer   Do you have any problems affording any of your prescribed medications? No   Is the patient taking medications as prescribed? yes   Do you have any financial concerns preventing you from receiving the healthcare you need? No   Does the patient have transportation to healthcare appointments? Yes   Transportation Available family or friend will provide;car   On Dialysis? No   Does the patient receive services at the Coumadin Clinic? No   Are there any open cases? No   Discharge Plan A Home   Discharge Plan B Home with family   Patient/Family In  Agreement With Plan yes     Ni Vang RN Transitional Navigator  [phone]

## 2017-05-27 NOTE — ED NOTES
Daughter arrived and is requesting that her mother be transferred to main campus. States that she had a bad experience with her grandfather here before and does not want her mother admitted her. After speaking with pt, pt does not wish to be transferred. Daughter appears upset with pt's decision. Daughter asking when her pt will be getting a room. Updated daughter on POC and reason for delays. Daughter is requesting to speak with a supervisor and a doctor.

## 2017-05-29 ENCOUNTER — PATIENT OUTREACH (OUTPATIENT)
Dept: ADMINISTRATIVE | Facility: CLINIC | Age: 62
End: 2017-05-29
Payer: COMMERCIAL

## 2017-05-29 NOTE — DISCHARGE SUMMARY
[facility]  Discharge Summary      Admit Date: 5/26/2017    Discharge Date and Time: 5/27/2017  3:35 PM    Attending Physician: Dr. Yuri Goins    Reason for Admission: Intractable nausea and vomiting likely 2/2 gastroparesis    Procedures Performed: * No surgery found *    History of Present Illness:  Patient is a 61 y.o. female with PMH of HTN, DM, HLD, GERD, Throat cancer s/p radiation (completed 4 years ago) presents with abdominal pain, Nausea and vomiting x 18 hours. She reports about 10-15 episodes of non-bloody, non-bilious vomiting.  Patient states that she has had similar episodes of vomiting in the past and has been worked up at  with EGD, colonoscopy and capsule cameras.   She denies sick contacts, changes to diet. She denies diarrhea or constipation, chest pain or sob. She endorses subjective fever and chills since this AM.      IN ED, KUB no obstruction, CXR no acute process, CT ab pending. Trop, LA, amylase, lipase all wnl. Tmax 100.6. Pt received dicycolmine, benedryl, reglan, zofran, phenegran.     Hospital Course:    Patient was admitted to [facility] service for intractable nausea and vomiting with fever. Blood and urine cultures were obtained with no growth noted. KUB and CTab/pelvis were negative for acute process or obstruction. Patient was given anti-emetics and IVF. Patient remained afebrile from time of admission and nausea improved. No vomiting since time of admission. Patient had BM in the AM and tolerated clear liquids breakfast. Diet was advance at lunch and patient also tolerated well.    Patient was hemodynamically stable and deemed appropriate for discharge. Patient will follow up with PCP. Patient my benefit from gastric emptying study as an outpatient.       Consults: PT and OT    Significant Diagnostic Studies:  LABS  CBC    Recent Labs  Lab 05/26/17  0806 05/27/17  0859   WBC 7.04 7.92   RBC 4.99 4.67   HGB 14.0 13.0   HCT 42.1 39.1   * 390*   MCV 84 84    MCH 28.1 27.8   MCHC 33.3 33.2     BMP    Recent Labs  Lab 05/26/17  0806 05/27/17  0859    139   K 3.7 3.5   CO2 25 25    106   BUN 21 14   CREATININE 0.7 0.8   * 215*     POCT-Glucose  POCT Glucose   Date Value Ref Range Status   05/27/2017 196 (H) 70 - 110 mg/dL Final   05/27/2017 168 (H) 70 - 110 mg/dL Final   05/26/2017 166 (H) 70 - 110 mg/dL Final         Recent Labs  Lab 05/26/17  0806 05/27/17  0859   CALCIUM 9.8 8.6*   MG 1.7 1.6   PHOS  --  2.0*     LFT    Recent Labs  Lab 05/26/17  0806 05/27/17  0859   PROT 7.4 6.4   ALBUMIN 4.3 3.7   BILITOT 0.4 0.6   AST 27 18   ALKPHOS 49* 39*   ALT 32 23       CE    Recent Labs  Lab 05/26/17  0806   TROPONINI <0.006     LAST HbA1c  Lab Results   Component Value Date    HGBA1C 6.4 (H) 05/26/2017     Imaging Results          CT Abdomen Pelvis  Without Contrast (Final result)  Result time 05/26/17 20:16:35    Final result by Caesar Woods MD (05/26/17 20:16:35)                 Impression:         Fatty infiltration of liver.        Electronically signed by: Dr. Caesar Woods MD  Date:     05/26/17  Time:    20:16              Narrative:    CT OF THE ABDOMEN & PELVIS WITHOUT CONTRAST.    Technique:  Using helical CT technique, 5 mm axial images of the abdomen and pelvis were obtained from the lung bases through the ischial tuberosities.  No IV contrast performed as per request; lack of intravenous contrast, while optimal for detection of renal calculi, limits detection of parenchymal organ disease.     Comparison: None.     Findings:     ABDOMEN: Note that non-contrast CT is relatively insensitive for evaluation of the solid organs.   - Lung bases: No infiltrates and no nodules.   - Liver: Diffuse fatty infiltration of liver.   - Gallbladder: No calcified gallstones.    - Bile Ducts: No evidence of dilated ducts.  - Spleen: Negative.  - Pancreas: Unremarkable.  - Adrenals: Unremarkable.  - Kidneys: No hydronephrosis or calculi.  -  Retroperitoneum: No significant adenopathy.  - Vascular: No abdominal aortic aneurysm.   - Abdominal wall:  Unremarkable.     PELVIS:  No pelvic adenopathy, free fluid, or mass.    BOWEL/MESENTERY:   No evidence of bowel obstruction or inflammatory process. No CT evidence for appendicitis there is no evidence for inflammation in the RIGHT lower quadrant.  Appendix not directly visualized.      BONES: No acute bony abnormality or suspicious lytic or blastic lesion.                             X-Ray Abdomen Flat And Erect (Final result)  Result time 05/26/17 08:45:27    Final result by Jessica Aranda MD (05/26/17 08:45:27)                 Impression:         1.  No evidence of bowel obstruction.               Narrative:    Abdomen series.    Comparison: None    Results: Flat and erect views.  Moderate amount of retained stool.  No unusual intestinal gas pattern in particular no evidence of bowel obstruction or ileus.  The soft tissues show no organomegaly or masses.  No unusual calcifications.   The skeletal structures show  nothing unusual for age, gender, or habitus .  No free air is seen.  The lung bases are clear.                             X-Ray Chest PA And Lateral (Final result)  Result time 05/26/17 08:45:43    Final result by Jessica Aranda MD (05/26/17 08:45:43)                 Impression:      No abnormality seen.               Narrative:    PA AND LATERAL CHEST    Comparison: None    Results: PA and lateral views of the chest.The lung volume appears adequate.  No parenchymal or pleural abnormality seen.  The cardiac silhouette and pulmonary vascularity appear within normal limits.  No evidence of hilar lymph node enlargement.  No accumulation of pleural fluid or pneumothorax.  The osseous structures appear within normal limits.                                Final Diagnoses:    Principal Problem: Nausea and vomiting   Secondary Diagnoses:   Active Hospital Problems    Diagnosis  POA     *Nausea and vomiting [R11.2]  Unknown    Benign essential HTN [I10]  Unknown    HLD (hyperlipidemia) [E78.5]  Unknown    DM (diabetes mellitus), type 2 [E11.9]  Yes    Fever [R50.9]  Unknown    Abdominal pain [R10.9]  Yes      Resolved Hospital Problems    Diagnosis Date Resolved POA   No resolved problems to display.       Discharged Condition: good    Disposition: Home or Self Care    Follow Up/Patient Instructions:     Medications:  Reconciled Home Medications:   Discharge Medication List as of 5/27/2017  2:13 PM      START taking these medications    Details   promethazine (PHENERGAN) 25 MG suppository Place 1 suppository (25 mg total) rectally every 6 (six) hours as needed for Nausea., Starting Fri 5/26/2017, Print      ondansetron (ZOFRAN-ODT) 4 MG TbDL Take 1 tablet (4 mg total) by mouth every 6 (six) hours as needed (nausea or vomiting)., Starting Fri 5/26/2017, Print         CONTINUE these medications which have NOT CHANGED    Details   fenofibric acid (FIBRICOR) 135 mg CpDR Take 135 mg by mouth once daily., Historical Med      lisinopril 10 MG tablet Take 10 mg by mouth once daily., Historical Med      metformin (GLUCOPHAGE) 500 MG tablet Take 500 mg by mouth 2 (two) times daily with meals., Historical Med      pravastatin (PRAVACHOL) 40 MG tablet Take 40 mg by mouth once daily., Historical Med      SAXagliptin (ONGLYZA) 5 mg Tab tablet Take by mouth once daily., Historical Med         STOP taking these medications       meloxicam (MOBIC) 7.5 MG tablet Comments:   Reason for Stopping:               Discharge Procedure Orders  Diet Diabetic 2200 Calories     Diet Cardiac     Activity as tolerated     Call MD for:  temperature >100.4     Call MD for:  persistent nausea and vomiting or diarrhea     Call MD for:  severe uncontrolled pain     Call MD for:  redness, tenderness, or signs of infection (pain, swelling, redness, odor or green/yellow discharge around incision site)     Call MD for:  difficulty  breathing or increased cough     Call MD for:  severe persistent headache     Call MD for:  worsening rash     Call MD for:  persistent dizziness, light-headedness, or visual disturbances     Call MD for:  increased confusion or weakness       Follow-up Information     Schedule an appointment as soon as possible for a visit with Ricki Polanco MD.    Specialty:  Internal Medicine  Contact information:  [address] [address]  [phone]                 Brianne Solorio D.O.  [facility] HO-1  05/29/2017

## 2017-05-31 LAB
BACTERIA BLD CULT: NORMAL
BACTERIA BLD CULT: NORMAL

## 2017-06-02 ENCOUNTER — NURSE TRIAGE (OUTPATIENT)
Dept: ADMINISTRATIVE | Facility: CLINIC | Age: 62
End: 2017-06-02

## 2017-06-02 NOTE — TELEPHONE ENCOUNTER
Reason for Disposition   Patient sounds very sick or weak to the triager    Protocols used: ST HAND AND WRIST PAIN-A-OH    Patient was seen in [place] ED on 5/26 and reports have a swollen right hand, the hand an IV was in. Patient states she has elevated it and applied ice but it is not going down. It is painful. Patient advised to go to ED for evaluation. She verbalized understanding.

## 2017-06-19 PROBLEM — I15.2 HYPERTENSION ASSOCIATED WITH DIABETES: Status: ACTIVE | Noted: 2017-06-19

## 2017-06-19 PROBLEM — E11.59 HYPERTENSION ASSOCIATED WITH DIABETES: Status: ACTIVE | Noted: 2017-06-19

## 2018-06-28 ENCOUNTER — TELEPHONE (OUTPATIENT)
Dept: OBSTETRICS AND GYNECOLOGY | Facility: CLINIC | Age: 63
End: 2018-06-28

## 2018-06-28 NOTE — TELEPHONE ENCOUNTER
Dr Cloud pt last seen 2015 and having something coming out of the vagina like possible uterine Prolapse. Also states right side pain and the possible Prolapse is making it hard for her to urinate. Pt # [phone]

## 2018-06-29 ENCOUNTER — APPOINTMENT (OUTPATIENT)
Dept: RADIOLOGY | Facility: OTHER | Age: 63
End: 2018-06-29
Attending: OBSTETRICS & GYNECOLOGY
Payer: COMMERCIAL

## 2018-06-29 ENCOUNTER — TELEPHONE (OUTPATIENT)
Dept: UROGYNECOLOGY | Facility: CLINIC | Age: 63
End: 2018-06-29

## 2018-06-29 ENCOUNTER — OFFICE VISIT (OUTPATIENT)
Dept: OBSTETRICS AND GYNECOLOGY | Facility: CLINIC | Age: 63
End: 2018-06-29
Payer: COMMERCIAL

## 2018-06-29 VITALS
DIASTOLIC BLOOD PRESSURE: 78 MMHG | SYSTOLIC BLOOD PRESSURE: 120 MMHG | HEIGHT: 64 IN | BODY MASS INDEX: 25.21 KG/M2 | WEIGHT: 147.69 LBS

## 2018-06-29 DIAGNOSIS — Z12.31 ENCOUNTER FOR SCREENING MAMMOGRAM FOR MALIGNANT NEOPLASM OF BREAST: ICD-10-CM

## 2018-06-29 DIAGNOSIS — N81.10 FEMALE CYSTOCELE: Primary | ICD-10-CM

## 2018-06-29 PROCEDURE — 99213 OFFICE O/P EST LOW 20 MIN: CPT | Mod: S$GLB,,, | Performed by: OBSTETRICS & GYNECOLOGY

## 2018-06-29 PROCEDURE — 77067 SCR MAMMO BI INCL CAD: CPT | Mod: TC,PN

## 2018-06-29 PROCEDURE — 3008F BODY MASS INDEX DOCD: CPT | Mod: CPTII,S$GLB,, | Performed by: OBSTETRICS & GYNECOLOGY

## 2018-06-29 PROCEDURE — 77067 SCR MAMMO BI INCL CAD: CPT | Mod: 26,,, | Performed by: RADIOLOGY

## 2018-06-29 PROCEDURE — 3074F SYST BP LT 130 MM HG: CPT | Mod: CPTII,S$GLB,, | Performed by: OBSTETRICS & GYNECOLOGY

## 2018-06-29 PROCEDURE — 77063 BREAST TOMOSYNTHESIS BI: CPT | Mod: 26,,, | Performed by: RADIOLOGY

## 2018-06-29 PROCEDURE — 99999 PR PBB SHADOW E&M-EST. PATIENT-LVL III: CPT | Mod: PBBFAC,,, | Performed by: OBSTETRICS & GYNECOLOGY

## 2018-06-29 PROCEDURE — 3078F DIAST BP <80 MM HG: CPT | Mod: CPTII,S$GLB,, | Performed by: OBSTETRICS & GYNECOLOGY

## 2018-06-29 RX ORDER — NAPROXEN SODIUM 220 MG/1
81 TABLET, FILM COATED ORAL DAILY
COMMUNITY

## 2018-06-29 RX ORDER — ESOMEPRAZOLE SODIUM 40 MG/5ML
INJECTION INTRAVENOUS
COMMUNITY
Start: 2015-09-15 | End: 2018-09-28

## 2018-06-29 NOTE — TELEPHONE ENCOUNTER
Spoke with pt scheduled a consult appointment for 7/5/18 appointment letter placed in the mail, pt voiced understanding and call was ended.

## 2018-06-29 NOTE — TELEPHONE ENCOUNTER
----- Message from Bettie Daniel MA sent at 6/29/2018  1:27 PM CDT -----  Wax pt with significant cystocele, causing problems with voiding as well. Orders entered for referral to Dr. Conley but pt needs to get in soon so if someone else has sooner availability then she can see them. Please let me know if anything is needed from us to get her scheduled.     Thanks,  DRE Alexandre.

## 2018-07-02 NOTE — PROGRESS NOTES
"CC: bulge in vagina    Radha Minaya is a 62 y.o. female  c/o worsening bulge from vagina over past years.  She is established but I haven't seen patient since 2015.  Has h/o TVH for benign reasons.  C/o worsening prolapse and most recently having problems emptying her bladder.  Voids twice in the morning but feels it is not empty, does not void until 3pm.  Feels like her ability to void has gotten progressively more difficult.  No nocturia.  No incontinence.  No urgency.  She cannot give urine specimen today bc she just emptied at her house before coming.  No bleeding.  Has pulling in her abdomen.      Past Medical History:   Diagnosis Date    Diabetes mellitus     Hypertension     Throat cancer     treated with radiation       Past Surgical History:   Procedure Laterality Date    knee scope Right     Throat cancer excision      TVH         OB History    Para Term  AB Living   3 3 3 0 0 0   SAB TAB Ectopic Multiple Live Births   0 0 0 0 0      # Outcome Date GA Lbr Robert/2nd Weight Sex Delivery Anes PTL Lv   3 Term            2 Term            1 Term                   Family History   Problem Relation Age of Onset    Breast cancer Mother 52    Colon cancer Neg Hx     Ovarian cancer Neg Hx        Social History   Substance Use Topics    Smoking status: Former Smoker    Smokeless tobacco: Never Used    Alcohol use No       /78   Ht 5' 4" (1.626 m)   Wt 67 kg (147 lb 11.3 oz)   BMI 25.35 kg/m²     ROS:  GENERAL: Denies weight gain or weight loss. Feeling well overall.   SKIN: Denies rash or lesions.   HEAD: Denies head injury or headache.   NODES: Denies enlarged lymph nodes.   CHEST: Denies chest pain or shortness of breath.   CARDIOVASCULAR: Denies palpitations or left sided chest pain.   ABDOMEN: No abdominal pain, constipation, diarrhea, nausea, vomiting or rectal bleeding.   URINARY: No frequency, dysuria, hematuria, or burning on urination.  REPRODUCTIVE: See " HPI.   BREASTS: denies pain, lumps, or nipple discharge.   HEMATOLOGIC: No easy bruisability or excessive bleeding.  MUSCULOSKELETAL: Denies joint pain or swelling.   NEUROLOGIC: Denies syncope or weakness.   PSYCHIATRIC: Denies depression, anxiety or mood swings.    Physical Exam:    APPEARANCE: Well nourished, well developed, in no acute distress.  AFFECT: WNL, alert and oriented x 3  SKIN: No acne or hirsutism  NECK: Neck symmetric without masses or thyromegaly  NODES: No inguinal, cervical, axillary, or femoral lymph node enlargement  CHEST: Good respiratory effect  ABDOMEN: Soft.  No tenderness or masses.  No hepatosplenomegaly.  No hernias.  PELVIC: Normal external genitalia without lesions.  Normal hair distribution.  Adequate perineal body, normal urethral meatus.  Vagina moist and well rugated without lesions or discharge.  Cervix absent.  No significant cystocele or rectocele.  Bimanual exam shows uterus to be absent.  Adnexa without masses or tenderness.  Grade 3+ cystocele with valsalva, with suspected apical and side wall prolapse.  Mild rectocele.  EXTREMITIES: No edema.    ASSESSMENT AND PLAN    Radha was seen today for vaginal prolapse.    Diagnoses and all orders for this visit:    Female cystocele  -     Ambulatory Referral to Urogynecology    Encounter for screening mammogram for malignant neoplasm of breast  -     Mammo Digital Screening Bilat with Tomosynthesis CAD; Future    patient able to reduce the cystoscele herself.  Recommend urogyn consult and if has trouble voiding and hasn't gone in 8-12 hrs needs to go to ED    Follow-up in about 3 months (around 9/29/2018) for annual.

## 2018-07-05 ENCOUNTER — INITIAL CONSULT (OUTPATIENT)
Dept: UROGYNECOLOGY | Facility: CLINIC | Age: 63
End: 2018-07-05
Attending: OBSTETRICS & GYNECOLOGY
Payer: COMMERCIAL

## 2018-07-05 VITALS
SYSTOLIC BLOOD PRESSURE: 150 MMHG | DIASTOLIC BLOOD PRESSURE: 72 MMHG | HEIGHT: 64 IN | BODY MASS INDEX: 25.33 KG/M2 | WEIGHT: 148.38 LBS

## 2018-07-05 DIAGNOSIS — N39.3 URINARY, INCONTINENCE, STRESS FEMALE: Primary | ICD-10-CM

## 2018-07-05 DIAGNOSIS — N99.3 VAGINAL VAULT PROLAPSE, POSTHYSTERECTOMY: ICD-10-CM

## 2018-07-05 DIAGNOSIS — N81.9 VAGINAL VAULT PROLAPSE: ICD-10-CM

## 2018-07-05 DIAGNOSIS — N81.6 RECTOCELE, FEMALE: ICD-10-CM

## 2018-07-05 DIAGNOSIS — N81.11 CYSTOCELE, MIDLINE: ICD-10-CM

## 2018-07-05 DIAGNOSIS — N95.2 VAGINAL ATROPHY: ICD-10-CM

## 2018-07-05 PROCEDURE — 51701 INSERT BLADDER CATHETER: CPT | Mod: S$GLB,,, | Performed by: OBSTETRICS & GYNECOLOGY

## 2018-07-05 PROCEDURE — 99999 PR PBB SHADOW E&M-EST. PATIENT-LVL III: CPT | Mod: PBBFAC,,, | Performed by: OBSTETRICS & GYNECOLOGY

## 2018-07-05 PROCEDURE — 99245 OFF/OP CONSLTJ NEW/EST HI 55: CPT | Mod: 25,S$GLB,, | Performed by: OBSTETRICS & GYNECOLOGY

## 2018-07-05 PROCEDURE — 87086 URINE CULTURE/COLONY COUNT: CPT

## 2018-07-05 NOTE — PATIENT INSTRUCTIONS
1)  Stage 3 cystocele/stage 2 rectocele/stage 1 vaginal vault prolapse:  --observation vs pessary vs surgery (robotic sacral colpopexy, poss A&P repair)  --if surgery: would like ovaries removed (+FH), need UDS/cysto to assess stress incontinence/need for sling  --would like surgery--next available    2) Female stress incontinence:  --urine C&S  --control diabetes  --Empty bladder every 3 hours.  Empty well: wait a minute, lean forward on toilet.    --Avoid dietary irritants (see sheet).  Keep diary x 3-5 days to determine your irritants.  --KEGELS: do 10 in AM and 10 in PM, holding each x 10 seconds.  When you feel urge to go, STOP, KEGEL, and when urge has passed, then go to bathroom.  Consider PT in future.    --STRESS:  Pessary vs. Sling. need UDS/cysto to assess stress incontinence/need for sling    3)  Vaginal atrophy (dryness):  Use REPLENS or REFRESH OTC: 1/2 applicator full in vagina twice a week.    --consider vaginal estrogen after surgery    4)  RTC for UDS/cysto/consents signed.  NP will call you to discuss surgery dates.

## 2018-07-05 NOTE — PROGRESS NOTES
"[facility] [address] [place] [address]    Radha Minaya  [id number]  1955  2018    Consulting Physician: Galen Cloud MD   GYN: MD Ai  Primary M.D.: Ricki Polanco MD    Chief Complaint   Patient presents with    Consult     prolapse       HPI:     1)  UI:  (+) PHILIPPE. (--) UUI  X 1-2 years.  (+) pads (liners):2/day, usually minimum wetness and 1/night usually minimum wetness.  Daytime frequency: Q 4 hours.  Nocturia: Yes:0- 1/night.   (--) dysuria,  (--) hematuria,  (--) frequent UTIs.  (--) complete bladder emptying. PV to help with variable results.     2)  POP:  Present x 2 years--saw GYN and was told was mild, now worsening. Below introitus, about egg-sized.  Symptoms:(+)  RLQ with bulge, low back pain, difficult urination, pressure.  (--) vaginal bleeding. (--) vaginal discharge. (--) sexually active due to POP.  (--) h/o dysparenia. (--)  Vaginal dryness.  (--) vaginal estrogen use.     3)  BM:  (--) constipation/straining.  (--) chronic diarrhea. (--) hematochezia.  (--) fecal incontinence.  (--) fecal smearing/urgency.  (+) complete evacuation.     Past Medical History  Past Medical History:   Diagnosis Date    Diabetes mellitus     Hypertension     Throat cancer     treated with radiation   DM: FBG avg 120s; has some hand ?neuropathy; no known secondary issues  Lab Results   Component Value Date    HGBA1C 6.4 (H) 2017       Past Surgical History  Past Surgical History:   Procedure Laterality Date    knee scope Right     Throat cancer excision      TVH        Hysterectomy: Yes   Date: .  Indication: "spot on womb."    Type: vaginal  Cervix present: No  Ovaries present: Yes   Other procedures at time of hysterectomy:  none    Past Ob History     x 3.  C/s x 0.    Largest infant weight: 7#12oz.   yes FAVD. yes episiotomy.      Gynecologic History  LMP: No LMP recorded. Patient has had a hysterectomy.  Age of menarche: " 15 yo  Age of menopause: with TVH  Menstrual history: h/o normal  Pap test: post TVH.  History of abnormal paps: No.  History of STIs:  No  Mammogram: Date of last: 6/29/18.  Result: Normal  Colonoscopy: Date of last: ~2014 (, Dr. Talley).  Result:  Normal per report.  Repeat due: ~2014 per report.    DEXA:  Date of last: ~2016 per report ( with PCP).  Result:  Normal per report.  Repeat due: per PCP at .     Family History  Family History   Problem Relation Age of Onset    Breast cancer Mother 52    Colon cancer Neg Hx     Ovarian cancer Neg Hx     Cancer Neg Hx       Colon CA: No  Breast CA: Yes - mother  GYN CA: mat aunt and cousin OVCA   CA: No    Social History  History   Smoking Status    Former Smoker   Smokeless Tobacco    Never Used     Cessation date: ~2014.  PPD:  1/2 x 38 years.   History   Alcohol Use No   .    History   Drug Use No     The patient is .  Resides in [place] [address].  Employment status: retired salesperson.      Allergies  Review of patient's allergies indicates:   Allergen Reactions    Iodine Itching and Rash       Medications  Current Outpatient Prescriptions on File Prior to Visit   Medication Sig Dispense Refill    aspirin 81 MG Chew Take 81 mg by mouth once daily.      BIOTIN ORAL Take 10,000 mg by mouth.      docosahexanoic acid/epa (FISH OIL ORAL) Take 1,200 mg by mouth.      esomeprazole (NEXIUM) 40 mg injection once a day      fenofibric acid (FIBRICOR) 135 mg CpDR Take 135 mg by mouth once daily.      lisinopril 10 MG tablet Take 10 mg by mouth once daily.      metformin (GLUCOPHAGE) 500 MG tablet Take 500 mg by mouth 2 (two) times daily with meals.      multivit-min/iron/folic/lutein (CENTRUM SILVER WOMEN ORAL) Take by mouth.      pravastatin (PRAVACHOL) 40 MG tablet Take 40 mg by mouth once daily.       No current facility-administered medications on file prior to visit.        Review of Systems A 14 point ROS was reviewed with pertinent  "positives as noted above in the history of present illness.      Constitutional: negative  Eyes: negative  Endocrine: negative  Gastrointestinal: negative  Cardiovascular: negative  Respiratory: negative  Allergic/Immunologic: negative  Integumentary: negative  Psychiatric: negative  Musculoskeletal: negative   Ear/Nose/Throat: negative  Neurologic: negative  Genitourinary: SEE HPI  Hematologic/Lymphatic: negative   Breast: negative    Urogynecologic Exam  BP (!) 150/72 (BP Location: Right arm, Patient Position: Sitting, BP Method: Medium (Manual))   Ht 5' 4" (1.626 m)   Wt 67.3 kg (148 lb 5.9 oz)   BMI 25.47 kg/m²     GENERAL APPEARANCE:  The patient is well-developed, well-nourished.  Neck:  Supple with no thyromegaly, no carotid bruits.  Heart:  Regular rate and rhythm, no murmurs, rubs or gallops.  Lungs:  Clear.  No CVA tenderness.  Abdomen:  Soft, nontender, nondistended, no hepatosplenomegaly.  Incisions:  none    PELVIC:    External genitalia:  Normal Bartholins, Skenes and labia bilaterally.    Urethra:  No caruncle, diverticulum or masses.  (+) hypermobility.    Vagina:  Atrophy (--) , no bladder masses or tender, no discharge.    Cervix:  absent  Uterus: uterus absent  Adnexa: Not palpable.    POP-Q:  Aa +3; Ba +3; C -6; Ap 0; Bp 0.  Genital hiatus 5, perineal body 2, total vaginal length 11.    NEUROLOGIC:  Cranial nerves 2 through 12 intact.  Strength 5/5.  DTRs 2+ lower extremities.  S2 through 4 normal.  Sacral reflexes intact.    EXT: RODRIGUEZ, 2+ pulses bilaterally, no C/C/E    COUGH STRESS TEST:  negative  KEGEL:unable to perform    RECTAL:    External:  Normal, (--) hemorrhoids, (--) dovetailing.   Internal:  deferred    PVR: 30 mL    Impression    1. Urinary, incontinence, stress female    2. Vaginal vault prolapse, posthysterectomy    3. Cystocele, midline    4. Rectocele, female    5. Vaginal vault prolapse    6. Vaginal atrophy        Initial Plan  The patient was counseled regarding these " issues. The patient was given a summary sheet containing each of these issues with possible options for evaluation and management. When appropriate, we also reviewed computer-generated diagrams specific to their diagnoses..  All questions were addressed to the patient's satisfaction.    1)  Stage 3 cystocele/stage 2 rectocele/stage 1 vaginal vault prolapse:  --observation vs pessary vs surgery (robotic sacral colpopexy, poss A&P repair)  --if surgery: would like ovaries removed (+FH), need UDS/cysto to assess stress incontinence/need for sling  --would like surgery--next available    2) Female stress incontinence:  --urine C&S  --control diabetes  --Empty bladder every 3 hours.  Empty well: wait a minute, lean forward on toilet.    --Avoid dietary irritants (see sheet).  Keep diary x 3-5 days to determine your irritants.  --KEGELS: do 10 in AM and 10 in PM, holding each x 10 seconds.  When you feel urge to go, STOP, KEGEL, and when urge has passed, then go to bathroom.  Consider PT in future. --STRESS:  Pessary vs. Sling. need UDS/cysto to assess stress incontinence/need for sling    3)  Vaginal atrophy (dryness):  Use REPLENS or REFRESH OTC: 1/2 applicator full in vagina twice a week.    --consider vaginal estrogen after surgery    4)  RTC for UDS/cysto/consents signed.  NP will call you to discuss surgery dates.      Approximately 60 min were spent in consult, 90 % in discussion.     Thank you for requesting consultation of your patient.  I look forward to participating in their care.    Jordana Conley  Female Pelvic Medicine and Reconstructive Surgery  [facility] [place], [place]

## 2018-07-05 NOTE — LETTER
July 8, 2018      Galen Cloud MD  [address]  [address]  [facility] [address]           [facility] [address] [place] [address]  Phone: [phone]          Patient: Radha Minaya   MR Number: [id number]   YOB: 1955   Date of Visit: 7/5/2018       Dear Dr. Galen Cloud:    Thank you for referring Radha Minaya to me for evaluation. Attached you will find relevant portions of my assessment and plan of care.    If you have questions, please do not hesitate to call me. I look forward to following Radha Minaya along with you.    Sincerely,    Jordana Conley MD    Enclosure  CC:  No Recipients    If you would like to receive this communication electronically, please contact [email or web address] or [phone] to request more information on [employer] access.    For providers and/or their staff who would like to refer a patient to [facility], please contact us through our one-stop-shop provider referral line, [facility], at [phone].    If you feel you have received this communication in error or would no longer like to receive these types of communications, please e-mail [email or web address]

## 2018-07-08 PROBLEM — N81.11 CYSTOCELE, MIDLINE: Status: ACTIVE | Noted: 2018-07-08

## 2018-07-08 PROBLEM — N39.3 URINARY, INCONTINENCE, STRESS FEMALE: Status: ACTIVE | Noted: 2018-07-08

## 2018-07-08 PROBLEM — N99.3 VAGINAL VAULT PROLAPSE, POSTHYSTERECTOMY: Status: ACTIVE | Noted: 2018-07-08

## 2018-07-08 PROBLEM — N81.6 RECTOCELE, FEMALE: Status: ACTIVE | Noted: 2018-07-08

## 2018-07-08 PROBLEM — N95.2 VAGINAL ATROPHY: Status: ACTIVE | Noted: 2018-07-08

## 2018-07-08 PROBLEM — N81.9 VAGINAL VAULT PROLAPSE: Status: ACTIVE | Noted: 2018-07-08

## 2018-07-08 LAB — BACTERIA UR CULT: NO GROWTH

## 2018-07-13 ENCOUNTER — TELEPHONE (OUTPATIENT)
Dept: UROGYNECOLOGY | Facility: CLINIC | Age: 63
End: 2018-07-13

## 2018-07-13 NOTE — TELEPHONE ENCOUNTER
----- Message from Michelle Salinas sent at 7/13/2018  1:35 PM CDT -----  Contact: self  Pt needing a call back, regarding her surgery, she can be reached at [phone].

## 2018-07-13 NOTE — TELEPHONE ENCOUNTER
Spoke with pt and informed her NP Leeroy is the person who schedules the surgeries but is out the office until Wednesday 7/18/18,  to check back next week. Pt voiced understanding and call ended.

## 2018-07-20 ENCOUNTER — TELEPHONE (OUTPATIENT)
Dept: UROGYNECOLOGY | Facility: CLINIC | Age: 63
End: 2018-07-20

## 2018-08-03 DIAGNOSIS — N81.6 RECTOCELE: ICD-10-CM

## 2018-08-03 DIAGNOSIS — Z01.818 PREOP TESTING: ICD-10-CM

## 2018-08-03 DIAGNOSIS — N39.3 SUI (STRESS URINARY INCONTINENCE), MALE: ICD-10-CM

## 2018-08-03 DIAGNOSIS — N81.9 VAGINAL VAULT PROLAPSE: Primary | ICD-10-CM

## 2018-08-03 DIAGNOSIS — N81.11 MIDLINE CYSTOCELE: ICD-10-CM

## 2018-08-09 ENCOUNTER — HOSPITAL ENCOUNTER (OUTPATIENT)
Dept: PREADMISSION TESTING | Facility: OTHER | Age: 63
Discharge: HOME | End: 2018-08-09
Attending: OBSTETRICS & GYNECOLOGY
Payer: COMMERCIAL

## 2018-08-09 ENCOUNTER — PROCEDURE VISIT (OUTPATIENT)
Dept: UROGYNECOLOGY | Facility: CLINIC | Age: 63
End: 2018-08-09
Payer: COMMERCIAL

## 2018-08-09 ENCOUNTER — ANESTHESIA EVENT (OUTPATIENT)
Dept: SURGERY | Facility: OTHER | Age: 63
End: 2018-08-09
Payer: COMMERCIAL

## 2018-08-09 ENCOUNTER — OFFICE VISIT (OUTPATIENT)
Dept: UROGYNECOLOGY | Facility: CLINIC | Age: 63
End: 2018-08-09
Payer: COMMERCIAL

## 2018-08-09 VITALS
TEMPERATURE: 98 F | SYSTOLIC BLOOD PRESSURE: 120 MMHG | DIASTOLIC BLOOD PRESSURE: 61 MMHG | OXYGEN SATURATION: 99 % | DIASTOLIC BLOOD PRESSURE: 70 MMHG | HEART RATE: 70 BPM | SYSTOLIC BLOOD PRESSURE: 137 MMHG | HEIGHT: 65 IN | BODY MASS INDEX: 24.68 KG/M2 | HEIGHT: 65 IN | WEIGHT: 148 LBS | BODY MASS INDEX: 24.66 KG/M2 | WEIGHT: 148.13 LBS

## 2018-08-09 VITALS
BODY MASS INDEX: 24.68 KG/M2 | SYSTOLIC BLOOD PRESSURE: 158 MMHG | WEIGHT: 148.13 LBS | HEIGHT: 65 IN | DIASTOLIC BLOOD PRESSURE: 78 MMHG

## 2018-08-09 DIAGNOSIS — N81.11 CYSTOCELE, MIDLINE: ICD-10-CM

## 2018-08-09 DIAGNOSIS — N81.6 RECTOCELE, FEMALE: ICD-10-CM

## 2018-08-09 DIAGNOSIS — Z01.818 PREOP TESTING: ICD-10-CM

## 2018-08-09 DIAGNOSIS — N95.2 VAGINAL ATROPHY: ICD-10-CM

## 2018-08-09 DIAGNOSIS — N39.3 URINARY, INCONTINENCE, STRESS FEMALE: ICD-10-CM

## 2018-08-09 DIAGNOSIS — N99.3 VAGINAL VAULT PROLAPSE, POSTHYSTERECTOMY: ICD-10-CM

## 2018-08-09 DIAGNOSIS — N99.3 VAGINAL VAULT PROLAPSE, POSTHYSTERECTOMY: Primary | ICD-10-CM

## 2018-08-09 LAB
ABO + RH BLD: NORMAL
BILIRUB SERPL-MCNC: NORMAL MG/DL
BLD GP AB SCN CELLS X3 SERPL QL: NORMAL
BLOOD URINE, POC: NORMAL
COLOR, POC UA: NORMAL
GLUCOSE UR QL STRIP: NORMAL
KETONES UR QL STRIP: NORMAL
LEUKOCYTE ESTERASE URINE, POC: NORMAL
NITRITE, POC UA: NORMAL
PH, POC UA: 6
PROTEIN, POC: NORMAL
SPECIFIC GRAVITY, POC UA: 1.01
UROBILINOGEN, POC UA: NORMAL

## 2018-08-09 PROCEDURE — 99999 PR PBB SHADOW E&M-EST. PATIENT-LVL III: CPT | Mod: PBBFAC,,, | Performed by: NURSE PRACTITIONER

## 2018-08-09 PROCEDURE — 99213 OFFICE O/P EST LOW 20 MIN: CPT | Mod: 25,S$GLB,, | Performed by: NURSE PRACTITIONER

## 2018-08-09 PROCEDURE — 81002 URINALYSIS NONAUTO W/O SCOPE: CPT | Mod: S$GLB,,, | Performed by: OBSTETRICS & GYNECOLOGY

## 2018-08-09 PROCEDURE — 3078F DIAST BP <80 MM HG: CPT | Mod: CPTII,S$GLB,, | Performed by: NURSE PRACTITIONER

## 2018-08-09 PROCEDURE — 51797 INTRAABDOMINAL PRESSURE TEST: CPT | Mod: S$GLB,,, | Performed by: OBSTETRICS & GYNECOLOGY

## 2018-08-09 PROCEDURE — 51741 ELECTRO-UROFLOWMETRY FIRST: CPT | Mod: 51,S$GLB,, | Performed by: OBSTETRICS & GYNECOLOGY

## 2018-08-09 PROCEDURE — 3008F BODY MASS INDEX DOCD: CPT | Mod: CPTII,S$GLB,, | Performed by: NURSE PRACTITIONER

## 2018-08-09 PROCEDURE — 87086 URINE CULTURE/COLONY COUNT: CPT

## 2018-08-09 PROCEDURE — 3074F SYST BP LT 130 MM HG: CPT | Mod: CPTII,S$GLB,, | Performed by: NURSE PRACTITIONER

## 2018-08-09 PROCEDURE — 86901 BLOOD TYPING SEROLOGIC RH(D): CPT

## 2018-08-09 PROCEDURE — 51784 ANAL/URINARY MUSCLE STUDY: CPT | Mod: 51,S$GLB,, | Performed by: OBSTETRICS & GYNECOLOGY

## 2018-08-09 PROCEDURE — 52000 CYSTOURETHROSCOPY: CPT | Mod: 59,S$GLB,, | Performed by: OBSTETRICS & GYNECOLOGY

## 2018-08-09 PROCEDURE — 51728 CYSTOMETROGRAM W/VP: CPT | Mod: S$GLB,,, | Performed by: OBSTETRICS & GYNECOLOGY

## 2018-08-09 PROCEDURE — 36415 COLL VENOUS BLD VENIPUNCTURE: CPT

## 2018-08-09 RX ORDER — LIDOCAINE HYDROCHLORIDE 10 MG/ML
0.5 INJECTION, SOLUTION EPIDURAL; INFILTRATION; INTRACAUDAL; PERINEURAL ONCE
Status: CANCELLED | OUTPATIENT
Start: 2018-08-09 | End: 2018-08-09

## 2018-08-09 RX ORDER — ONDANSETRON 4 MG/1
4 TABLET, FILM COATED ORAL 3 TIMES DAILY PRN
Refills: 0 | COMMUNITY
Start: 2018-07-18

## 2018-08-09 RX ORDER — MECLIZINE HYDROCHLORIDE 25 MG/1
25 TABLET ORAL 3 TIMES DAILY PRN
Refills: 1 | COMMUNITY
Start: 2018-07-18

## 2018-08-09 RX ORDER — MIDAZOLAM HYDROCHLORIDE 5 MG/ML
2 INJECTION INTRAMUSCULAR; INTRAVENOUS ONCE AS NEEDED
Status: CANCELLED | OUTPATIENT
Start: 2018-08-09 | End: 2018-08-09

## 2018-08-09 RX ORDER — ALPRAZOLAM 0.5 MG/1
0.5 TABLET ORAL NIGHTLY PRN
COMMUNITY

## 2018-08-09 RX ORDER — OXYCODONE HYDROCHLORIDE 5 MG/1
5 TABLET ORAL ONCE AS NEEDED
Status: CANCELLED | OUTPATIENT
Start: 2018-08-09 | End: 2018-08-09

## 2018-08-09 RX ORDER — SODIUM CHLORIDE, SODIUM LACTATE, POTASSIUM CHLORIDE, CALCIUM CHLORIDE 600; 310; 30; 20 MG/100ML; MG/100ML; MG/100ML; MG/100ML
INJECTION, SOLUTION INTRAVENOUS CONTINUOUS
Status: CANCELLED | OUTPATIENT
Start: 2018-08-09

## 2018-08-09 RX ORDER — LIDOCAINE HYDROCHLORIDE 20 MG/ML
JELLY TOPICAL ONCE
Status: COMPLETED | OUTPATIENT
Start: 2018-08-09 | End: 2018-08-09

## 2018-08-09 RX ORDER — METFORMIN HYDROCHLORIDE 1000 MG/1
1000 TABLET ORAL 2 TIMES DAILY
Refills: 1 | COMMUNITY
Start: 2018-07-18

## 2018-08-09 RX ORDER — CIPROFLOXACIN 500 MG/1
500 TABLET ORAL
Status: COMPLETED | OUTPATIENT
Start: 2018-08-09 | End: 2018-08-09

## 2018-08-09 RX ORDER — FAMOTIDINE 20 MG/1
20 TABLET, FILM COATED ORAL
Status: CANCELLED | OUTPATIENT
Start: 2018-08-09 | End: 2018-08-09

## 2018-08-09 RX ADMIN — CIPROFLOXACIN 500 MG: 500 TABLET ORAL at 11:08

## 2018-08-09 RX ADMIN — LIDOCAINE HYDROCHLORIDE 5 ML: 20 JELLY TOPICAL at 11:08

## 2018-08-09 NOTE — ANESTHESIA PREPROCEDURE EVALUATION
08/09/2018  Radha Minaya is a 62 y.o., female.    Anesthesia Evaluation    I have reviewed the Patient Summary Reports.    I have reviewed the Nursing Notes.   I have reviewed the Medications.     Review of Systems  Anesthesia Hx:  No problems with previous Anesthesia    Social:  Former Smoker, No Alcohol Use    Hematology/Oncology:  Hematology Normal      Oncology Comments: H/O throat CA.    EENT/Dental:EENT/Dental Normal   Cardiovascular:   Exercise tolerance: good Hypertension, well controlled hyperlipidemia    Pulmonary:  Pulmonary Normal    Renal/:   PHILIPPE   Hepatic/GI:  Hepatic/GI Normal    Musculoskeletal:  Musculoskeletal Normal    Neurological:  Neurology Normal    Endocrine:   Diabetes, well controlled, type 2    Dermatological:  Skin Normal    Psych:  Psychiatric Normal           Physical Exam  General:  Well nourished    Airway/Jaw/Neck:  Airway Findings: Mouth Opening: Normal Tongue: Normal  General Airway Assessment: Adult, Good  Mallampati: I  TM Distance: Normal, at least 6 cm  Jaw/Neck Findings:  Neck ROM: Normal ROM      Dental:  Dental Findings: In tact        Mental Status:  Mental Status Findings:  Alert and Oriented, Cooperative         Anesthesia Plan  Type of Anesthesia, risks & benefits discussed:  Anesthesia Type:  general  Patient's Preference:   Intra-op Monitoring Plan: standard ASA monitors  Intra-op Monitoring Plan Comments:   Post Op Pain Control Plan:   Post Op Pain Control Plan Comments:   Induction:    Beta Blocker:         Informed Consent: Patient understands risks and agrees with Anesthesia plan.  Questions answered. Anesthesia consent signed with patient.  ASA Score: 2     Day of Surgery Review of History & Physical:    H&P update referred to the surgeon.     Anesthesia Plan Notes: Labs and clearance pending.        Ready For Surgery From Anesthesia Perspective.

## 2018-08-09 NOTE — H&P (VIEW-ONLY)
Urogyn follow up  08/09/2018  .  [facility] [address] [place] [address]    Radha Minaya  [id number]  1955      Radha Minaya is a 62 y.o.  here for a urogyn follow up.    Last HPI from 07/05/2018     1)  UI:  (+) PHILIPPE. (--) UUI  X 1-2 years.  (+) pads (liners):2/day, usually minimum wetness and 1/night usually minimum wetness.  Daytime frequency: Q 4 hours.  Nocturia: Yes:0- 1/night.   (--) dysuria,  (--) hematuria,  (--) frequent UTIs.  (--) complete bladder emptying. PV to help with variable results.      2)  POP:  Present x 2 years--saw GYN and was told was mild, now worsening. Below introitus, about egg-sized.  Symptoms:(+)  RLQ with bulge, low back pain, difficult urination, pressure.  (--) vaginal bleeding. (--) vaginal discharge. (--) sexually active due to POP.  (--) h/o dysparenia. (--)  Vaginal dryness.  (--) vaginal estrogen use.    POP-Q:  Aa +3; Ba +3; C -6; Ap 0; Bp 0.  Genital hiatus 5, perineal body 2, total vaginal length 11.    3)  BM:  (--) constipation/straining.  (--) chronic diarrhea. (--) hematochezia.  (--) fecal incontinence.  (--) fecal smearing/urgency.  (+) complete evacuation.     Changes from last visit:  None    Suds  08/09/2018  1.  VOIDING PHASE:       a.  Uroflowmetry:  · Prolapse reduction: No  · Voided volume:  712 mL   · Voiding time:   56 seconds  · Max flow:  32 mL/s  · Avg flow:   13 mL/s   · PVR:   25 mL     The overall configuration of this uroflow study was normal.       b.  Pressure flow:  · Prolapse reduction: No  · Voided volume:   591 mL  · Voiding time:   70 seconds  · Peak flow:  36 mL/s   · Avg flow:  10 mL/s  · Max det pressure:  3  cm H20  · Det pressure at max flow: negative as pves catheter was dislodged at start of study.    · Void initiated by mechanism unable to discern, as pves was dislodged at start of study.    · Urethral relaxation (EMG):  present.    · PVR (calculated):  0 mL     The overall  configuration of this pressure flow study was prolonged with unknown mechanism of void but otherwise normal.       2.  FILLING PHASE:  · 1st desire: 150 mL  · Normal desire:  300 mL  · Strong desire:  442 mL  · Urgency:  546 mL  · Compliance (calculated)  ~60 mL/cm H20  · EMG activity during filling:  Stable.  · Detrusor contractions observed: No.      3.  URETHRAL FUNCTION/STORAGE PHASE:     a.  WITH prolapse reduction:  · CLPP (150 mL): Negative  at  123 cm H20  · VLPP (150 mL): Negative  at  86 cm H20   · CLPP (300 mL): Negative  at  111 cm H20  · VLPP (300 mL): Negative  at  77 cm H20   · CLPP (450 mL ):    Negative  at  140 cm H20  · VLPP (450 mL):     Negative  at  98 cm H20  · CLPP (MAX ):    Negative  at  155 cm H20  · VLPP (MAX):     Negative  at  117 cm H20  · NEG CLPP and VLPP at max cap with pves catheter removed     These findings are consistent with Negative urodynamic stress incontinence.     Cysto    Findings: Urethroscopy:  Normal.  Cystoscopy:  Normal bladder mucosa, bilateral ureteral flow was noted.     Assessment: Essentially normal cystourethroscopy      Past Medical History:   Diagnosis Date    Diabetes mellitus     Hypertension     Throat cancer     treated with radiation larynx       Past Surgical History:   Procedure Laterality Date    HYSTERECTOMY      knee scope Right     Throat cancer excision  2014    TONSILLECTOMY      TV  1982       Current Outpatient Medications   Medication Sig    ALPRAZolam (XANAX) 0.5 MG tablet Take 0.5 mg by mouth nightly as needed for Anxiety.    aspirin 81 MG Chew Take 81 mg by mouth once daily.    BIOTIN ORAL Take 10,000 mg by mouth.    docosahexanoic acid/epa (FISH OIL ORAL) Take 1,200 mg by mouth.    esomeprazole (NEXIUM) 40 mg injection once a day    fenofibric acid (FIBRICOR) 135 mg CpDR Take 135 mg by mouth once daily.    lisinopril 10 MG tablet Take 10 mg by mouth once daily.    meclizine (ANTIVERT) 25 mg tablet Take 25 mg by mouth 3  "(three) times daily as needed.    metFORMIN (GLUCOPHAGE) 1000 MG tablet Take 1,000 mg by mouth 2 (two) times daily.    ondansetron (ZOFRAN) 4 MG tablet Take 4 mg by mouth 3 (three) times daily as needed.    pravastatin (PRAVACHOL) 40 MG tablet Take 40 mg by mouth once daily.    multivit-min/iron/folic/lutein (CENTRUM SILVER WOMEN ORAL) Take by mouth.     No current facility-administered medications for this visit.        Well woman:  Pap test: post TVH.  History of abnormal paps: No.  History of STIs:  No  Mammogram: Date of last: 6/29/18.  Result: Normal  Colonoscopy: Date of last: ~2014 (, Dr. Talley).  Result:  Normal per report.  Repeat due: ~2014 per report.    DEXA:  Date of last: ~2016 per report ( with PCP).  Result:  Normal per report.  Repeat due: per PCP at .    ROS:  As per HPI.      Exam  /70 (BP Location: Right arm, Patient Position: Sitting, BP Method: Medium (Manual))   Ht 5' 5" (1.651 m)   Wt 67.2 kg (148 lb 2.4 oz)   BMI 24.65 kg/m²   General: alert and oriented, no acute distress  Respiratory: normal respiratory effort  Abd: soft, non-tender, non-distended    Pelvic--deferred    Impression  1. Vaginal vault prolapse, posthysterectomy     2. Cystocele, midline     3. Rectocele, female     4. Vaginal atrophy       We reviewed the above issues and discussed options for short-term versus long-term management of her problems.   Plan:     1. Patient consented with Dr. Conley for robotic assisted laparoscopic sacrocolpopexy with synthetic mesh, possible uterosacral suspension.  removal of tubes/ovaries (+FH BRCA & OVCA),possible anterior/posterior repair with perineorrhaphy, possible laparotomy, placement of synthetic midurethral sling (did not leak during urodynamics-- will check for occult leakage in OR under anesthesia), and cystourethroscopy.   R/B/A reviewed. Specific risks reviewed include:  infection, bleeding, need for blood transfusion, damage to surrounding structures, " anesthesia risks, death, heart attack, stroke, mesh erosion/extrusion, pain, dyspareunia, urinary retention, voiding dysfunction, urinary incontinence, exacerbation of urinary urge incontinence, and need for further surgeries.  We reviewed potential for failure of POP defect repair and need for future surgery, with no way of predicting risk.  She understands success rate of ASC approaches 85%.  Success rate of midurethral sling for PHILIPPE was reviewed as 80-85%, and she understands that this will not necessarily impact other types of urinary incontinence. R/B/A of synthetic mesh reviewed, and handouts provided for review.  Patient understands and accepts these risks.   Alternatives reviewed include: pessary/PT for POP and pessary/periurethral injections/PT/medication for PHILIPPE.    2. T&S  3. Preoperative appointment with PCP or cardiology: Yes - saw Dr. Polanco.  Needs ECHO--patient to schedule ASAP--need to follow up.    4. VTE Prophylaxis:  heparin 5000 u SQ TID (1st dose 2hrs preop) + SCDs  5. Patient instructed on Magnesium citrate and chlorahexadine/dial soap prep to perform day before & AM of surgery.   6. Proceed to OR for above-mentioned procedure.    30 minutes were spent in face to face time with this patient  90 % of this time was spent in counseling and/or coordination of care    SARAN Bradshaw  [facility]  Division of Female Pelvic Medicine and Reconstructive Surgery  Department of Obstetrics & Gynecology    Addendum  --patient cleared for surgery SARAN Bradshaw

## 2018-08-09 NOTE — PROGRESS NOTES
Urogyn follow up  08/09/2018  .  [facility] [address] [place] [address]    Radha Minaya  [id number]  1955      Radha Minaya is a 62 y.o.  here for a urogyn follow up.    Last HPI from 07/05/2018     1)  UI:  (+) PHILIPPE. (--) UUI  X 1-2 years.  (+) pads (liners):2/day, usually minimum wetness and 1/night usually minimum wetness.  Daytime frequency: Q 4 hours.  Nocturia: Yes:0- 1/night.   (--) dysuria,  (--) hematuria,  (--) frequent UTIs.  (--) complete bladder emptying. PV to help with variable results.      2)  POP:  Present x 2 years--saw GYN and was told was mild, now worsening. Below introitus, about egg-sized.  Symptoms:(+)  RLQ with bulge, low back pain, difficult urination, pressure.  (--) vaginal bleeding. (--) vaginal discharge. (--) sexually active due to POP.  (--) h/o dysparenia. (--)  Vaginal dryness.  (--) vaginal estrogen use.    POP-Q:  Aa +3; Ba +3; C -6; Ap 0; Bp 0.  Genital hiatus 5, perineal body 2, total vaginal length 11.    3)  BM:  (--) constipation/straining.  (--) chronic diarrhea. (--) hematochezia.  (--) fecal incontinence.  (--) fecal smearing/urgency.  (+) complete evacuation.     Changes from last visit:  None    Suds  08/09/2018  1.  VOIDING PHASE:       a.  Uroflowmetry:  · Prolapse reduction: No  · Voided volume:  712 mL   · Voiding time:   56 seconds  · Max flow:  32 mL/s  · Avg flow:   13 mL/s   · PVR:   25 mL     The overall configuration of this uroflow study was normal.       b.  Pressure flow:  · Prolapse reduction: No  · Voided volume:   591 mL  · Voiding time:   70 seconds  · Peak flow:  36 mL/s   · Avg flow:  10 mL/s  · Max det pressure:  3  cm H20  · Det pressure at max flow: negative as pves catheter was dislodged at start of study.    · Void initiated by mechanism unable to discern, as pves was dislodged at start of study.    · Urethral relaxation (EMG):  present.    · PVR (calculated):  0 mL     The overall  configuration of this pressure flow study was prolonged with unknown mechanism of void but otherwise normal.       2.  FILLING PHASE:  · 1st desire: 150 mL  · Normal desire:  300 mL  · Strong desire:  442 mL  · Urgency:  546 mL  · Compliance (calculated)  ~60 mL/cm H20  · EMG activity during filling:  Stable.  · Detrusor contractions observed: No.      3.  URETHRAL FUNCTION/STORAGE PHASE:     a.  WITH prolapse reduction:  · CLPP (150 mL): Negative  at  123 cm H20  · VLPP (150 mL): Negative  at  86 cm H20   · CLPP (300 mL): Negative  at  111 cm H20  · VLPP (300 mL): Negative  at  77 cm H20   · CLPP (450 mL ):    Negative  at  140 cm H20  · VLPP (450 mL):     Negative  at  98 cm H20  · CLPP (MAX ):    Negative  at  155 cm H20  · VLPP (MAX):     Negative  at  117 cm H20  · NEG CLPP and VLPP at max cap with pves catheter removed     These findings are consistent with Negative urodynamic stress incontinence.     Cysto    Findings: Urethroscopy:  Normal.  Cystoscopy:  Normal bladder mucosa, bilateral ureteral flow was noted.     Assessment: Essentially normal cystourethroscopy      Past Medical History:   Diagnosis Date    Diabetes mellitus     Hypertension     Throat cancer     treated with radiation larynx       Past Surgical History:   Procedure Laterality Date    HYSTERECTOMY      knee scope Right     Throat cancer excision  2014    TONSILLECTOMY      TV  1982       Current Outpatient Medications   Medication Sig    ALPRAZolam (XANAX) 0.5 MG tablet Take 0.5 mg by mouth nightly as needed for Anxiety.    aspirin 81 MG Chew Take 81 mg by mouth once daily.    BIOTIN ORAL Take 10,000 mg by mouth.    docosahexanoic acid/epa (FISH OIL ORAL) Take 1,200 mg by mouth.    esomeprazole (NEXIUM) 40 mg injection once a day    fenofibric acid (FIBRICOR) 135 mg CpDR Take 135 mg by mouth once daily.    lisinopril 10 MG tablet Take 10 mg by mouth once daily.    meclizine (ANTIVERT) 25 mg tablet Take 25 mg by mouth 3  "(three) times daily as needed.    metFORMIN (GLUCOPHAGE) 1000 MG tablet Take 1,000 mg by mouth 2 (two) times daily.    ondansetron (ZOFRAN) 4 MG tablet Take 4 mg by mouth 3 (three) times daily as needed.    pravastatin (PRAVACHOL) 40 MG tablet Take 40 mg by mouth once daily.    multivit-min/iron/folic/lutein (CENTRUM SILVER WOMEN ORAL) Take by mouth.     No current facility-administered medications for this visit.        Well woman:  Pap test: post TVH.  History of abnormal paps: No.  History of STIs:  No  Mammogram: Date of last: 6/29/18.  Result: Normal  Colonoscopy: Date of last: ~2014 (, Dr. Talley).  Result:  Normal per report.  Repeat due: ~2014 per report.    DEXA:  Date of last: ~2016 per report ( with PCP).  Result:  Normal per report.  Repeat due: per PCP at .    ROS:  As per HPI.      Exam  /70 (BP Location: Right arm, Patient Position: Sitting, BP Method: Medium (Manual))   Ht 5' 5" (1.651 m)   Wt 67.2 kg (148 lb 2.4 oz)   BMI 24.65 kg/m²   General: alert and oriented, no acute distress  Respiratory: normal respiratory effort  Abd: soft, non-tender, non-distended    Pelvic--deferred    Impression  1. Vaginal vault prolapse, posthysterectomy     2. Cystocele, midline     3. Rectocele, female     4. Vaginal atrophy       We reviewed the above issues and discussed options for short-term versus long-term management of her problems.   Plan:     1. Patient consented with Dr. Conley for robotic assisted laparoscopic sacrocolpopexy with synthetic mesh, possible uterosacral suspension.  removal of tubes/ovaries (+FH BRCA & OVCA),possible anterior/posterior repair with perineorrhaphy, possible laparotomy, placement of synthetic midurethral sling (did not leak during urodynamics-- will check for occult leakage in OR under anesthesia), and cystourethroscopy.   R/B/A reviewed. Specific risks reviewed include:  infection, bleeding, need for blood transfusion, damage to surrounding structures, " anesthesia risks, death, heart attack, stroke, mesh erosion/extrusion, pain, dyspareunia, urinary retention, voiding dysfunction, urinary incontinence, exacerbation of urinary urge incontinence, and need for further surgeries.  We reviewed potential for failure of POP defect repair and need for future surgery, with no way of predicting risk.  She understands success rate of ASC approaches 85%.  Success rate of midurethral sling for PHILIPPE was reviewed as 80-85%, and she understands that this will not necessarily impact other types of urinary incontinence. R/B/A of synthetic mesh reviewed, and handouts provided for review.  Patient understands and accepts these risks.   Alternatives reviewed include: pessary/PT for POP and pessary/periurethral injections/PT/medication for PHILIPPE.    2. T&S  3. Preoperative appointment with PCP or cardiology: Yes - saw Dr. Polanco.  Needs ECHO--patient to schedule ASAP--need to follow up.    4. VTE Prophylaxis:  heparin 5000 u SQ TID (1st dose 2hrs preop) + SCDs  5. Patient instructed on Magnesium citrate and chlorahexadine/dial soap prep to perform day before & AM of surgery.   6. Proceed to OR for above-mentioned procedure.    30 minutes were spent in face to face time with this patient  90 % of this time was spent in counseling and/or coordination of care    SARAN Bradshaw  [facility]  Division of Female Pelvic Medicine and Reconstructive Surgery  Department of Obstetrics & Gynecology    Addendum  --patient cleared for surgery SARAN Bradshaw

## 2018-08-09 NOTE — DISCHARGE INSTRUCTIONS
PRE-ADMIT TESTING -  [phone]    [address]  [place] [facility]          Your surgery has been scheduled at [facility]. We are pleased to have the opportunity to serve you. For Further Information please call [phone].    On the day of surgery please report to the Information Desk on the 1st floor.    · CONTACT YOUR PHYSICIAN'S OFFICE THE DAY PRIOR TO YOUR SURGERY TO OBTAIN YOUR ARRIVAL TIME.     · The evening before surgery do not eat anything after 9 p.m. ( this includes hard candy, chewing gum and mints).  You may only have GATORADE, POWERADE AND WATER  from 9 p.m. until you leave your home.   DO NOT DRINK ANY LIQUIDS ON THE WAY TO THE HOSPITAL.      SPECIAL MEDICATION INSTRUCTIONS: TAKE medications checked off by the Anesthesiologist on your Medication List.    Angiogram Patients: Take medications as instructed by your physician, including aspirin.     Surgery Patients:    If you take ASPIRIN - Your PHYSICIAN/SURGEON will need to inform you IF/OR when you need to stop taking aspirin prior to your surgery.     Do Not take any medications containing IBUPROFEN.  Do Not Wear any make-up or dark nail polish   (especially eye make-up) to surgery. If you come to surgery with makeup on you will be required to remove the makeup or nail polish.    Do not shave your surgical area at least 5 days prior to your surgery. The surgical prep will be performed at the hospital according to Infection Control regulations.    Leave all valuables at home.   Do Not wear any jewelry or watches, including any metal in body piercings.  Contact Lens must be removed before surgery. Either do not wear the contact lens or bring a case and solution for storage.  Please bring a container for eyeglasses or dentures as required.  Bring any paperwork your physician has provided, such as consent forms,  history and physicals, doctor's orders, etc.   Bring comfortable clothes that are loose fitting to wear upon  discharge. Take into consideration the type of surgery being performed.  Maintain your diet as advised per your physician the day prior to surgery.      Adequate rest the night before surgery is advised.   Park in the Parking lot behind the hospital or in the [facility] across the street from the parking lot. Parking is complimentary.  If you will be discharged the same day as your procedure, please arrange for a responsible adult to drive you home or to accompany you if traveling by taxi.   YOU WILL NOT BE PERMITTED TO DRIVE OR TO LEAVE THE HOSPITAL ALONE AFTER SURGERY.   It is strongly recommended that you arrange for someone to remain with you for the first 24 hrs following your surgery.       Thank you for your cooperation.  The Staff of [facility].        Bathing Instructions                                                                 Please shower the evening before and morning of your procedure with    ANTIBACTERIAL SOAP. ( DIAL, etc )  Concentrate on the surgical area   for at least 3 minutes and rinse completely. Dry off as usual.   Do not use any deodorant, powder, body lotions, perfume, after shave or    cologne.

## 2018-08-09 NOTE — PROGRESS NOTES
TITLE OF OPERATION:  1. Complex cystometry.  2. Complex uroflowmetry.  3. Electromyography with surface electrodes.  4. Pressure voiding flow study.  5. Abdominal pressure measurement.  6. Leak point pressure measurement.    INDICATIONS:  1)  UI:  (+) PHILIPPE. (--) UUI  X 1-2 years.  (+) pads (liners):2/day, usually minimum wetness and 1/night usually minimum wetness.  Daytime frequency: Q 4 hours.  Nocturia: Yes:0- 1/night.   (--) dysuria,  (--) hematuria,  (--) frequent UTIs.  (--) complete bladder emptying. PV to help with variable results.      2)  POP:  Present x 2 years--saw GYN and was told was mild, now worsening. Below introitus, about egg-sized.  Symptoms:(+)  RLQ with bulge, low back pain, difficult urination, pressure.  (--) vaginal bleeding. (--) vaginal discharge. (--) sexually active due to POP.  (--) h/o dysparenia. (--)  Vaginal dryness.  (--) vaginal estrogen use.    -- POP-Q:  Aa +3; Ba +3; C -6; Ap 0; Bp 0.  Genital hiatus 5, perineal body 2, total vaginal length 11.    3)  BM:  (--) constipation/straining.  (--) chronic diarrhea. (--) hematochezia.  (--) fecal incontinence.  (--) fecal smearing/urgency.  (+) complete evacuation.     PREOPERATIVE DIAGNOSIS:  1. Urinary, incontinence, stress female    2. Vaginal vault prolapse, posthysterectomy    3. Cystocele, midline    4. Rectocele, female    5. Vaginal vault prolapse    6. Vaginal atrophy        POSTOPERATIVE DIAGNOSIS:  1. Urinary, incontinence, stress female    2. Vaginal vault prolapse, posthysterectomy    3. Cystocele, midline    4. Rectocele, female    5. Vaginal vault prolapse    6. Vaginal atrophy      ANESTHESIA:  None.    SPECIMEN (BACTERIOLOGICAL, PATHOLOGICAL OR OTHER):  None.    PROSTHETIC DEVICE/IMPLANT:  None.    SURGEONS NARRATIVE:  A time out was performed in which the patient identity and procedure were confirmed.  Urodynamic evaluation was performed using a computerized system ([employer]  Uroflowmetry was  performed on the patient in the sitting position without catheters in place.  Subsequent urodynamic testing was performed with the patient in the lithotomy position at 45 degrees. Air charged catheters were used with sterile water as the infusion medium. Vesical and abdominal (rectal) pressures were measured, and detrusor pressure was calculated. EMG activity was recorded with surface electrodes. During filling, room temperature sterile water was infused at a rate of 30 cubic centimeters per minute. The patient was asked cough after instillation of each 100cc volume. Two Valsalva leak point pressures and two cough leak point pressures were performed with the catheters in place at 300 cubic centimeters and again at maximum capacity. Valsalva leak point pressure was defined as the difference between vesical pressure at which leakage was noted (visualized at the external urethral meatus) and the baseline vesical pressure. Following urodynamic testing, a pressure flow study was performed with the patient in the sitting position. Vesical and abdominal pressures were monitored and detrusor pressures were calculated. After the pressure flow study, the catheters were then removed. The patient tolerated the procedure well.     Urine dipstick: neg.    1.  VOIDING PHASE:      a.  Uroflowmetry:   Prolapse reduction: No   Voided volume:  712 mL    Voiding time:   56 seconds   Max flow:  32 mL/s   Avg flow:   13 mL/s    PVR:   25 mL    The overall configuration of this uroflow study was normal.      b.  Pressure flow:   Prolapse reduction: No   Voided volume:   591 mL   Voiding time:   70 seconds   Peak flow:  36 mL/s    Avg flow:  10 mL/s   Max det pressure:  3  cm H20   Det pressure at max flow: negative as pves catheter was dislodged at start of study.     Void initiated by mechanism unable to discern, as pves was dislodged at start of study.     Urethral relaxation (EMG):  present.     PVR (calculated):  0  mL    The overall configuration of this pressure flow study was prolonged with unknown mechanism of void but otherwise normal.      2.  FILLING PHASE:   1st desire: 150 mL   Normal desire:  300 mL   Strong desire:  442 mL   Urgency:  546 mL   Compliance (calculated)  ~60 mL/cm H20   EMG activity during filling:  Stable.   Detrusor contractions observed: No.     3.  URETHRAL FUNCTION/STORAGE PHASE:    a.  WITH prolapse reduction:   CLPP (150 mL): Negative  at  123 cm H20   VLPP (150 mL): Negative  at  86 cm H20    CLPP (300 mL): Negative  at  111 cm H20   VLPP (300 mL): Negative  at  77 cm H20    CLPP (450 mL ):    Negative  at  140 cm H20   VLPP (450 mL):     Negative  at  98 cm H20   CLPP (MAX ):    Negative  at  155 cm H20   VLPP (MAX):     Negative  at  117 cm H20   NEG CLPP and VLPP at max cap with pves catheter removed    These findings are consistent with Negative urodynamic stress incontinence.    Assessment:  UF normal.  PF prolonged with unknown mechanism of void but otherwise normal.  Compliance normal.  Max capacity 712 mL.  DO (--).  PHILIPPE (+).      Plan:  1)  Stage 3 cystocele/stage 2 rectocele/stage 1 vaginal vault prolapse:  --observation vs pessary vs surgery (robotic sacral colpopexy, poss A&P repair)  --if surgery: would like ovaries removed (+FH), need UDS/cysto to assess stress incontinence/need for sling  --surgery scheduled     2) Female stress incontinence:  --urine C&S  --control diabetes  --Empty bladder every 3 hours.  Empty well: wait a minute, lean forward on toilet.    --Avoid dietary irritants (see sheet).  Keep diary x 3-5 days to determine your irritants.  --KEGELS: do 10 in AM and 10 in PM, holding each x 10 seconds.  When you feel urge to go, STOP, KEGEL, and when urge has passed, then go to bathroom.  Consider PT in future.   --STRESS:  NEG UDS.  Will retest under anesthesia after repairing POP. Place MUS if indicated.      3)  Vaginal atrophy (dryness):  Use REPLENS  or REFRESH OTC: 1/2 applicator full in vagina twice a week.    --consider vaginal estrogen after surgery     ------------------------------------------------------------------------------------    Title of Operation:   Cystourethroscopy.     INDICATIONS:  As above    PREOPERATIVE DIAGNOSIS  As above    POSTOPERATIVE DIAGNOSIS:   As above    Anesthesia:   2% Xylocaine gel.    Specimen (Bacteriological, Pathological or other):   None.     Prosthetic Device/Implant:   None.     Surgeons Narrative:     After informed consent was obtained, the patient was placed in the lithotomy position. The urethral meatus was prepped with Betadine and 10 cubic centimeters of 2% Xylocaine gel were introduced into the urethra. A flexible cystourethroscope was introduced into the bladder. The bladder was distended with approximately 300 cubic centimeters of sterile water. A systematic survey was performed in which the bladder was surveyed using multiple sequential passes in a clockwise fashion from the bladder dome to the bladder base to the urethrovesical junction. The trigone and ureteral orifices were observed. The scope was then flipped back on itself, and the urethrovesical junction was viewed. A vaginal examining finger was then placed with pressure suburethrally at the urethrovesical junction as the telescope was withdrawn in order to perform positive pressure urethroscopy.  Standard maneuvers of cough, squeeze and Valsalva were performed. The telescope was then completely withdrawn.     Findings: Urethroscopy:  Normal.  Cystoscopy:  Normal bladder mucosa, bilateral ureteral flow was noted.     Assessment: Essentially normal cystourethroscopy.     Plan: The patient will follow up with Dr. Conley as scheduled.  See urodynamics note from 8/9/18 for further plan details.

## 2018-08-10 LAB — BACTERIA UR CULT: NO GROWTH

## 2018-08-20 ENCOUNTER — HOSPITAL ENCOUNTER (OUTPATIENT)
Facility: OTHER | Age: 63
Discharge: HOME | End: 2018-08-21
Attending: OBSTETRICS & GYNECOLOGY | Admitting: OBSTETRICS & GYNECOLOGY
Payer: COMMERCIAL

## 2018-08-20 ENCOUNTER — ANESTHESIA (OUTPATIENT)
Dept: SURGERY | Facility: OTHER | Age: 63
End: 2018-08-20
Payer: COMMERCIAL

## 2018-08-20 DIAGNOSIS — N81.9 VAGINAL VAULT PROLAPSE: ICD-10-CM

## 2018-08-20 DIAGNOSIS — N39.3 URINARY, INCONTINENCE, STRESS FEMALE: Primary | ICD-10-CM

## 2018-08-20 DIAGNOSIS — Z98.890 S/P ROBOT-ASSISTED SURGICAL PROCEDURE: ICD-10-CM

## 2018-08-20 DIAGNOSIS — N99.3 VAGINAL VAULT PROLAPSE, POSTHYSTERECTOMY: ICD-10-CM

## 2018-08-20 LAB
POCT GLUCOSE: 115 MG/DL (ref 70–110)
POCT GLUCOSE: 144 MG/DL (ref 70–110)
POCT GLUCOSE: 155 MG/DL (ref 70–110)

## 2018-08-20 PROCEDURE — 25000003 PHARM REV CODE 250: Performed by: SPECIALIST

## 2018-08-20 PROCEDURE — 58661 LAPAROSCOPY REMOVE ADNEXA: CPT | Mod: 50,51,, | Performed by: OBSTETRICS & GYNECOLOGY

## 2018-08-20 PROCEDURE — 57425 LAPAROSCOPY SURG COLPOPEXY: CPT | Mod: AS,,, | Performed by: NURSE PRACTITIONER

## 2018-08-20 PROCEDURE — 94799 UNLISTED PULMONARY SVC/PX: CPT

## 2018-08-20 PROCEDURE — 63600175 PHARM REV CODE 636 W HCPCS: Performed by: NURSE ANESTHETIST, CERTIFIED REGISTERED

## 2018-08-20 PROCEDURE — 58661 LAPAROSCOPY REMOVE ADNEXA: CPT | Mod: AS,50,51, | Performed by: NURSE PRACTITIONER

## 2018-08-20 PROCEDURE — G0378 HOSPITAL OBSERVATION PER HR: HCPCS

## 2018-08-20 PROCEDURE — 36000713 HC OR TIME LEV V EA ADD 15 MIN: Performed by: OBSTETRICS & GYNECOLOGY

## 2018-08-20 PROCEDURE — 71000039 HC RECOVERY, EACH ADD'L HOUR: Performed by: OBSTETRICS & GYNECOLOGY

## 2018-08-20 PROCEDURE — 25000003 PHARM REV CODE 250: Performed by: OBSTETRICS & GYNECOLOGY

## 2018-08-20 PROCEDURE — 94761 N-INVAS EAR/PLS OXIMETRY MLT: CPT

## 2018-08-20 PROCEDURE — 63600175 PHARM REV CODE 636 W HCPCS: Performed by: OBSTETRICS & GYNECOLOGY

## 2018-08-20 PROCEDURE — 25000003 PHARM REV CODE 250: Performed by: STUDENT IN AN ORGANIZED HEALTH CARE EDUCATION/TRAINING PROGRAM

## 2018-08-20 PROCEDURE — 63600175 PHARM REV CODE 636 W HCPCS: Performed by: SPECIALIST

## 2018-08-20 PROCEDURE — 88305 TISSUE EXAM BY PATHOLOGIST: CPT | Mod: 26,,, | Performed by: PATHOLOGY

## 2018-08-20 PROCEDURE — S0020 INJECTION, BUPIVICAINE HYDRO: HCPCS | Performed by: OBSTETRICS & GYNECOLOGY

## 2018-08-20 PROCEDURE — 88311 DECALCIFY TISSUE: CPT | Performed by: PATHOLOGY

## 2018-08-20 PROCEDURE — 63600175 PHARM REV CODE 636 W HCPCS: Performed by: ANESTHESIOLOGY

## 2018-08-20 PROCEDURE — C1781 MESH (IMPLANTABLE): HCPCS | Performed by: OBSTETRICS & GYNECOLOGY

## 2018-08-20 PROCEDURE — 25000003 PHARM REV CODE 250: Performed by: NURSE ANESTHETIST, CERTIFIED REGISTERED

## 2018-08-20 PROCEDURE — 63600175 PHARM REV CODE 636 W HCPCS: Performed by: STUDENT IN AN ORGANIZED HEALTH CARE EDUCATION/TRAINING PROGRAM

## 2018-08-20 PROCEDURE — 37000008 HC ANESTHESIA 1ST 15 MINUTES: Performed by: OBSTETRICS & GYNECOLOGY

## 2018-08-20 PROCEDURE — 36000712 HC OR TIME LEV V 1ST 15 MIN: Performed by: OBSTETRICS & GYNECOLOGY

## 2018-08-20 PROCEDURE — 37000009 HC ANESTHESIA EA ADD 15 MINS: Performed by: OBSTETRICS & GYNECOLOGY

## 2018-08-20 PROCEDURE — 88311 DECALCIFY TISSUE: CPT | Mod: 26,,, | Performed by: PATHOLOGY

## 2018-08-20 PROCEDURE — 11000001 HC ACUTE MED/SURG PRIVATE ROOM

## 2018-08-20 PROCEDURE — 57425 LAPAROSCOPY SURG COLPOPEXY: CPT | Mod: ,,, | Performed by: OBSTETRICS & GYNECOLOGY

## 2018-08-20 PROCEDURE — 27201423 OPTIME MED/SURG SUP & DEVICES STERILE SUPPLY: Performed by: OBSTETRICS & GYNECOLOGY

## 2018-08-20 PROCEDURE — 71000033 HC RECOVERY, INTIAL HOUR: Performed by: OBSTETRICS & GYNECOLOGY

## 2018-08-20 DEVICE — MESH VAGINAL PROLAPSE 10X15CM: Type: IMPLANTABLE DEVICE | Site: VAGINA | Status: FUNCTIONAL

## 2018-08-20 RX ORDER — ONDANSETRON 2 MG/ML
INJECTION INTRAMUSCULAR; INTRAVENOUS
Status: DISPENSED
Start: 2018-08-20 | End: 2018-08-21

## 2018-08-20 RX ORDER — SIMETHICONE 80 MG
2 TABLET,CHEWABLE ORAL 3 TIMES DAILY PRN
Status: DISCONTINUED | OUTPATIENT
Start: 2018-08-20 | End: 2018-08-20 | Stop reason: HOSPADM

## 2018-08-20 RX ORDER — SODIUM CHLORIDE 9 MG/ML
INJECTION, SOLUTION INTRAVENOUS CONTINUOUS
Status: DISCONTINUED | OUTPATIENT
Start: 2018-08-20 | End: 2018-08-21 | Stop reason: HOSPADM

## 2018-08-20 RX ORDER — HYDROCODONE BITARTRATE AND ACETAMINOPHEN 10; 325 MG/1; MG/1
1 TABLET ORAL EVERY 4 HOURS PRN
Status: DISCONTINUED | OUTPATIENT
Start: 2018-08-20 | End: 2018-08-21 | Stop reason: HOSPADM

## 2018-08-20 RX ORDER — ROCURONIUM BROMIDE 10 MG/ML
INJECTION, SOLUTION INTRAVENOUS
Status: DISCONTINUED | OUTPATIENT
Start: 2018-08-20 | End: 2018-08-20

## 2018-08-20 RX ORDER — ONDANSETRON 2 MG/ML
4 INJECTION INTRAMUSCULAR; INTRAVENOUS DAILY PRN
Status: DISCONTINUED | OUTPATIENT
Start: 2018-08-20 | End: 2018-08-20 | Stop reason: HOSPADM

## 2018-08-20 RX ORDER — FENTANYL CITRATE 50 UG/ML
25 INJECTION, SOLUTION INTRAMUSCULAR; INTRAVENOUS EVERY 5 MIN PRN
Status: COMPLETED | OUTPATIENT
Start: 2018-08-20 | End: 2018-08-20

## 2018-08-20 RX ORDER — LIDOCAINE HYDROCHLORIDE 10 MG/ML
0.5 INJECTION, SOLUTION EPIDURAL; INFILTRATION; INTRACAUDAL; PERINEURAL ONCE
Status: DISCONTINUED | OUTPATIENT
Start: 2018-08-20 | End: 2018-08-20 | Stop reason: HOSPADM

## 2018-08-20 RX ORDER — IBUPROFEN 200 MG
16 TABLET ORAL
Status: DISCONTINUED | OUTPATIENT
Start: 2018-08-20 | End: 2018-08-21 | Stop reason: HOSPADM

## 2018-08-20 RX ORDER — ONDANSETRON 8 MG/1
8 TABLET, ORALLY DISINTEGRATING ORAL EVERY 8 HOURS PRN
Status: DISCONTINUED | OUTPATIENT
Start: 2018-08-20 | End: 2018-08-21 | Stop reason: HOSPADM

## 2018-08-20 RX ORDER — IBUPROFEN 600 MG/1
600 TABLET ORAL EVERY 6 HOURS
Status: DISCONTINUED | OUTPATIENT
Start: 2018-08-21 | End: 2018-08-21 | Stop reason: HOSPADM

## 2018-08-20 RX ORDER — CEFAZOLIN SODIUM 2 G/50ML
2 SOLUTION INTRAVENOUS
Status: DISCONTINUED | OUTPATIENT
Start: 2018-08-20 | End: 2018-08-20 | Stop reason: HOSPADM

## 2018-08-20 RX ORDER — PRAVASTATIN SODIUM 20 MG/1
40 TABLET ORAL DAILY
Status: DISCONTINUED | OUTPATIENT
Start: 2018-08-21 | End: 2018-08-21 | Stop reason: HOSPADM

## 2018-08-20 RX ORDER — ALPRAZOLAM 0.5 MG/1
0.5 TABLET ORAL NIGHTLY PRN
Status: DISCONTINUED | OUTPATIENT
Start: 2018-08-20 | End: 2018-08-21 | Stop reason: HOSPADM

## 2018-08-20 RX ORDER — BUPIVACAINE HYDROCHLORIDE 5 MG/ML
INJECTION, SOLUTION EPIDURAL; INTRACAUDAL
Status: DISCONTINUED | OUTPATIENT
Start: 2018-08-20 | End: 2018-08-20 | Stop reason: HOSPADM

## 2018-08-20 RX ORDER — HEPARIN SODIUM 5000 [USP'U]/ML
5000 INJECTION, SOLUTION INTRAVENOUS; SUBCUTANEOUS EVERY 8 HOURS
Status: DISCONTINUED | OUTPATIENT
Start: 2018-08-20 | End: 2018-08-21 | Stop reason: HOSPADM

## 2018-08-20 RX ORDER — PANTOPRAZOLE SODIUM 40 MG/1
40 TABLET, DELAYED RELEASE ORAL DAILY
Status: DISCONTINUED | OUTPATIENT
Start: 2018-08-21 | End: 2018-08-21 | Stop reason: HOSPADM

## 2018-08-20 RX ORDER — MECLIZINE HYDROCHLORIDE 25 MG/1
25 TABLET ORAL 3 TIMES DAILY PRN
Status: DISCONTINUED | OUTPATIENT
Start: 2018-08-20 | End: 2018-08-21 | Stop reason: HOSPADM

## 2018-08-20 RX ORDER — FAMOTIDINE 20 MG/1
20 TABLET, FILM COATED ORAL
Status: COMPLETED | OUTPATIENT
Start: 2018-08-20 | End: 2018-08-20

## 2018-08-20 RX ORDER — MEPERIDINE HYDROCHLORIDE 50 MG/ML
12.5 INJECTION INTRAMUSCULAR; INTRAVENOUS; SUBCUTANEOUS ONCE AS NEEDED
Status: DISCONTINUED | OUTPATIENT
Start: 2018-08-20 | End: 2018-08-20 | Stop reason: HOSPADM

## 2018-08-20 RX ORDER — FENTANYL CITRATE 50 UG/ML
INJECTION, SOLUTION INTRAMUSCULAR; INTRAVENOUS
Status: DISCONTINUED | OUTPATIENT
Start: 2018-08-20 | End: 2018-08-20

## 2018-08-20 RX ORDER — NEOSTIGMINE METHYLSULFATE 1 MG/ML
INJECTION, SOLUTION INTRAVENOUS
Status: DISCONTINUED | OUTPATIENT
Start: 2018-08-20 | End: 2018-08-20

## 2018-08-20 RX ORDER — HEPARIN SODIUM 5000 [USP'U]/ML
5000 INJECTION, SOLUTION INTRAVENOUS; SUBCUTANEOUS
Status: DISCONTINUED | OUTPATIENT
Start: 2018-08-20 | End: 2018-08-20 | Stop reason: HOSPADM

## 2018-08-20 RX ORDER — GLUCAGON 1 MG
1 KIT INJECTION
Status: DISCONTINUED | OUTPATIENT
Start: 2018-08-20 | End: 2018-08-21 | Stop reason: HOSPADM

## 2018-08-20 RX ORDER — GLYCOPYRROLATE 0.2 MG/ML
INJECTION INTRAMUSCULAR; INTRAVENOUS
Status: DISCONTINUED | OUTPATIENT
Start: 2018-08-20 | End: 2018-08-20

## 2018-08-20 RX ORDER — ACETAMINOPHEN 10 MG/ML
INJECTION, SOLUTION INTRAVENOUS
Status: DISCONTINUED | OUTPATIENT
Start: 2018-08-20 | End: 2018-08-20

## 2018-08-20 RX ORDER — LIDOCAINE HCL/PF 100 MG/5ML
SYRINGE (ML) INTRAVENOUS
Status: DISCONTINUED | OUTPATIENT
Start: 2018-08-20 | End: 2018-08-20

## 2018-08-20 RX ORDER — OXYCODONE HYDROCHLORIDE 5 MG/1
5 TABLET ORAL
Status: DISCONTINUED | OUTPATIENT
Start: 2018-08-20 | End: 2018-08-20 | Stop reason: HOSPADM

## 2018-08-20 RX ORDER — MIDAZOLAM HYDROCHLORIDE 1 MG/ML
2 INJECTION INTRAMUSCULAR; INTRAVENOUS ONCE AS NEEDED
Status: COMPLETED | OUTPATIENT
Start: 2018-08-20 | End: 2018-08-20

## 2018-08-20 RX ORDER — HYDROCODONE BITARTRATE AND ACETAMINOPHEN 5; 325 MG/1; MG/1
2 TABLET ORAL EVERY 4 HOURS PRN
Status: DISCONTINUED | OUTPATIENT
Start: 2018-08-20 | End: 2018-08-21 | Stop reason: HOSPADM

## 2018-08-20 RX ORDER — HYDROMORPHONE HYDROCHLORIDE 2 MG/ML
0.4 INJECTION, SOLUTION INTRAMUSCULAR; INTRAVENOUS; SUBCUTANEOUS EVERY 5 MIN PRN
Status: DISCONTINUED | OUTPATIENT
Start: 2018-08-20 | End: 2018-08-20 | Stop reason: HOSPADM

## 2018-08-20 RX ORDER — IBUPROFEN 200 MG
24 TABLET ORAL
Status: DISCONTINUED | OUTPATIENT
Start: 2018-08-20 | End: 2018-08-21 | Stop reason: HOSPADM

## 2018-08-20 RX ORDER — ONDANSETRON 2 MG/ML
INJECTION INTRAMUSCULAR; INTRAVENOUS
Status: DISCONTINUED | OUTPATIENT
Start: 2018-08-20 | End: 2018-08-20

## 2018-08-20 RX ORDER — OXYCODONE HYDROCHLORIDE 5 MG/1
5 TABLET ORAL ONCE AS NEEDED
Status: COMPLETED | OUTPATIENT
Start: 2018-08-20 | End: 2018-08-20

## 2018-08-20 RX ORDER — HYDROMORPHONE HYDROCHLORIDE 1 MG/ML
1 INJECTION, SOLUTION INTRAMUSCULAR; INTRAVENOUS; SUBCUTANEOUS EVERY 4 HOURS PRN
Status: DISCONTINUED | OUTPATIENT
Start: 2018-08-20 | End: 2018-08-21 | Stop reason: HOSPADM

## 2018-08-20 RX ORDER — MIDAZOLAM HYDROCHLORIDE 1 MG/ML
INJECTION INTRAMUSCULAR; INTRAVENOUS
Status: DISCONTINUED | OUTPATIENT
Start: 2018-08-20 | End: 2018-08-20

## 2018-08-20 RX ORDER — METOCLOPRAMIDE HYDROCHLORIDE 5 MG/ML
5 INJECTION INTRAMUSCULAR; INTRAVENOUS EVERY 6 HOURS PRN
Status: DISCONTINUED | OUTPATIENT
Start: 2018-08-20 | End: 2018-08-21 | Stop reason: HOSPADM

## 2018-08-20 RX ORDER — INSULIN ASPART 100 [IU]/ML
0-5 INJECTION, SOLUTION INTRAVENOUS; SUBCUTANEOUS
Status: DISCONTINUED | OUTPATIENT
Start: 2018-08-20 | End: 2018-08-21 | Stop reason: HOSPADM

## 2018-08-20 RX ORDER — DEXAMETHASONE SODIUM PHOSPHATE 4 MG/ML
INJECTION, SOLUTION INTRA-ARTICULAR; INTRALESIONAL; INTRAMUSCULAR; INTRAVENOUS; SOFT TISSUE
Status: DISCONTINUED | OUTPATIENT
Start: 2018-08-20 | End: 2018-08-20

## 2018-08-20 RX ORDER — KETOROLAC TROMETHAMINE 30 MG/ML
INJECTION, SOLUTION INTRAMUSCULAR; INTRAVENOUS
Status: DISCONTINUED | OUTPATIENT
Start: 2018-08-20 | End: 2018-08-20

## 2018-08-20 RX ORDER — HYDROMORPHONE HYDROCHLORIDE 1 MG/ML
1 INJECTION, SOLUTION INTRAMUSCULAR; INTRAVENOUS; SUBCUTANEOUS EVERY 4 HOURS PRN
Status: DISCONTINUED | OUTPATIENT
Start: 2018-08-20 | End: 2018-08-20

## 2018-08-20 RX ORDER — SODIUM CHLORIDE 0.9 % (FLUSH) 0.9 %
3 SYRINGE (ML) INJECTION
Status: DISCONTINUED | OUTPATIENT
Start: 2018-08-20 | End: 2018-08-21 | Stop reason: HOSPADM

## 2018-08-20 RX ORDER — SODIUM CHLORIDE, SODIUM LACTATE, POTASSIUM CHLORIDE, CALCIUM CHLORIDE 600; 310; 30; 20 MG/100ML; MG/100ML; MG/100ML; MG/100ML
INJECTION, SOLUTION INTRAVENOUS CONTINUOUS
Status: DISCONTINUED | OUTPATIENT
Start: 2018-08-20 | End: 2018-08-20

## 2018-08-20 RX ORDER — PROPOFOL 10 MG/ML
VIAL (ML) INTRAVENOUS
Status: DISCONTINUED | OUTPATIENT
Start: 2018-08-20 | End: 2018-08-20

## 2018-08-20 RX ADMIN — ROCURONIUM BROMIDE 20 MG: 10 INJECTION INTRAVENOUS at 04:08

## 2018-08-20 RX ADMIN — FAMOTIDINE 20 MG: 20 TABLET ORAL at 10:08

## 2018-08-20 RX ADMIN — SODIUM CHLORIDE: 0.9 INJECTION, SOLUTION INTRAVENOUS at 09:08

## 2018-08-20 RX ADMIN — LIDOCAINE HYDROCHLORIDE 100 MG: 20 INJECTION, SOLUTION INTRAVENOUS at 02:08

## 2018-08-20 RX ADMIN — ROCURONIUM BROMIDE 40 MG: 10 INJECTION INTRAVENOUS at 02:08

## 2018-08-20 RX ADMIN — FENTANYL CITRATE 25 MCG: 50 INJECTION, SOLUTION INTRAMUSCULAR; INTRAVENOUS at 07:08

## 2018-08-20 RX ADMIN — ACETAMINOPHEN 1000 MG: 10 INJECTION, SOLUTION INTRAVENOUS at 05:08

## 2018-08-20 RX ADMIN — HEPARIN SODIUM 5000 UNITS: 5000 INJECTION, SOLUTION INTRAVENOUS; SUBCUTANEOUS at 09:08

## 2018-08-20 RX ADMIN — MIDAZOLAM HYDROCHLORIDE 2 MG: 1 INJECTION, SOLUTION INTRAMUSCULAR; INTRAVENOUS at 10:08

## 2018-08-20 RX ADMIN — ONDANSETRON 4 MG: 2 INJECTION INTRAMUSCULAR; INTRAVENOUS at 06:08

## 2018-08-20 RX ADMIN — DEXAMETHASONE SODIUM PHOSPHATE 4 MG: 4 INJECTION, SOLUTION INTRAMUSCULAR; INTRAVENOUS at 06:08

## 2018-08-20 RX ADMIN — KETOROLAC TROMETHAMINE 30 MG: 30 INJECTION, SOLUTION INTRAMUSCULAR; INTRAVENOUS at 06:08

## 2018-08-20 RX ADMIN — CARBOXYMETHYLCELLULOSE SODIUM 2 DROP: 2.5 SOLUTION/ DROPS OPHTHALMIC at 02:08

## 2018-08-20 RX ADMIN — HYDROCODONE BITARTRATE AND ACETAMINOPHEN 1 TABLET: 10; 325 TABLET ORAL at 10:08

## 2018-08-20 RX ADMIN — CEFAZOLIN SODIUM 2 G: 2 SOLUTION INTRAVENOUS at 02:08

## 2018-08-20 RX ADMIN — PROPOFOL 200 MG: 10 INJECTION, EMULSION INTRAVENOUS at 02:08

## 2018-08-20 RX ADMIN — HYDROMORPHONE HYDROCHLORIDE 1 MG: 1 INJECTION, SOLUTION INTRAMUSCULAR; INTRAVENOUS; SUBCUTANEOUS at 09:08

## 2018-08-20 RX ADMIN — OXYCODONE HYDROCHLORIDE 5 MG: 5 TABLET ORAL at 06:08

## 2018-08-20 RX ADMIN — HEPARIN SODIUM 5000 UNITS: 5000 INJECTION, SOLUTION INTRAVENOUS; SUBCUTANEOUS at 10:08

## 2018-08-20 RX ADMIN — FENTANYL CITRATE 50 MCG: 50 INJECTION, SOLUTION INTRAMUSCULAR; INTRAVENOUS at 03:08

## 2018-08-20 RX ADMIN — ROCURONIUM BROMIDE 10 MG: 10 INJECTION INTRAVENOUS at 03:08

## 2018-08-20 RX ADMIN — MIDAZOLAM HYDROCHLORIDE 2 MG: 1 INJECTION, SOLUTION INTRAMUSCULAR; INTRAVENOUS at 01:08

## 2018-08-20 RX ADMIN — SODIUM CHLORIDE, SODIUM LACTATE, POTASSIUM CHLORIDE, AND CALCIUM CHLORIDE: 600; 310; 30; 20 INJECTION, SOLUTION INTRAVENOUS at 01:08

## 2018-08-20 RX ADMIN — NEOSTIGMINE METHYLSULFATE 5 MG: 1 INJECTION INTRAVENOUS at 05:08

## 2018-08-20 RX ADMIN — FENTANYL CITRATE 100 MCG: 50 INJECTION, SOLUTION INTRAMUSCULAR; INTRAVENOUS at 02:08

## 2018-08-20 RX ADMIN — ONDANSETRON 4 MG: 2 INJECTION, SOLUTION INTRAMUSCULAR; INTRAVENOUS at 07:08

## 2018-08-20 RX ADMIN — GLYCOPYRROLATE 0.8 MG: 0.2 INJECTION, SOLUTION INTRAMUSCULAR; INTRAVENOUS at 05:08

## 2018-08-20 NOTE — TRANSFER OF CARE
"Anesthesia Transfer of Care Note    Patient: Radha Mianya    Procedure(s) Performed: Procedure(s) (LRB):  ROBOTIC SACROCOLPOPEXY, ABDOMEN (N/A)  CYSTOSCOPY (N/A)  ROBOTIC SALPINGO-OOPHORECTOMY (Bilateral)    Patient location: PACU    Anesthesia Type: general    Transport from OR: Transported from OR on 2-3 L/min O2 by NC with adequate spontaneous ventilation    Post pain: adequate analgesia    Post assessment: no apparent anesthetic complications    Post vital signs: stable    Level of consciousness: awake    Nausea/Vomiting: no nausea/vomiting    Complications: none    Transfer of care protocol was followed      Last vitals:   Visit Vitals  BP (!) 121/58 (BP Location: Left arm, Patient Position: Sitting)   Pulse 66   Temp 36.3 °C (97.4 °F) (Oral)   Resp 18   Ht 5' 5" (1.651 m)   Wt 67.1 kg (147 lb 16 oz)   SpO2 96%   Breastfeeding? No   BMI 24.63 kg/m²     "

## 2018-08-20 NOTE — INTERVAL H&P NOTE
The patient has been examined and the H&P has been reviewed:    I concur with the findings and no changes have occurred since H&P was written.    Per patient, echo was done and was told everything looked good.    Active Hospital Problems    Diagnosis  POA    Vaginal vault prolapse [N81.9]  Yes      Resolved Hospital Problems   No resolved problems to display.     Ciara Rider MD  OBGYN - PGY 4

## 2018-08-20 NOTE — OP NOTE
Title of Operation:  1)  Robotic-assisted laparoscopic bilateral salpingo-oophorectomy   2)  Robotic-assisted laparoscopic sacral colpopexy with polypropylene mesh  3)  Cystourethroscopy    Indications for Surgery:  1)  UI:  (+) PHILIPPE. (--) UUI  X 1-2 years.  (+) pads (liners):2/day, usually minimum wetness and 1/night usually minimum wetness.  Daytime frequency: Q 4 hours.  Nocturia: Yes:0- 1/night.   (--) dysuria,  (--) hematuria,  (--) frequent UTIs.  (--) complete bladder emptying. PV to help with variable results.   --8/9/18 UDS:  3.  URETHRAL FUNCTION/STORAGE PHASE:   a.  WITH prolapse reduction:  · CLPP (150 mL): Negative  at  123 cm H20  · VLPP (150 mL): Negative  at  86 cm H20   · CLPP (300 mL): Negative  at  111 cm H20  · VLPP (300 mL): Negative  at  77 cm H20   · CLPP (450 mL ):    Negative  at  140 cm H20  · VLPP (450 mL):     Negative  at  98 cm H20  · CLPP (MAX ):    Negative  at  155 cm H20  · VLPP (MAX):     Negative  at  117 cm H20  · NEG CLPP and VLPP at max cap with pves catheter removed     These findings are consistent with Negative urodynamic stress incontinence.  Cysto  Findings: Urethroscopy:  Normal.  Cystoscopy:  Normal bladder mucosa, bilateral ureteral flow was noted.   Assessment: Essentially normal cystourethroscopy    2)  POP:  Present x 2 years--saw GYN and was told was mild, now worsening. Below introitus, about egg-sized.  Symptoms:(+)  RLQ with bulge, low back pain, difficult urination, pressure.  (--) vaginal bleeding. (--) vaginal discharge. (--) sexually active due to POP.  (--) h/o dysparenia. (--)  Vaginal dryness.  (--) vaginal estrogen use.    POP-Q:  Aa +3; Ba +3; C -6; Ap 0; Bp 0.  Genital hiatus 5, perineal body 2, total vaginal length 11.     3)  BM:  (--) constipation/straining.  (--) chronic diarrhea. (--) hematochezia.  (--) fecal incontinence.  (--) fecal smearing/urgency.  (+) complete evacuation.     Preoperative Diagnosis:  1. Urinary, incontinence, stress female     2. Vaginal vault prolapse, posthysterectomy    3. Cystocele, midline    4. Rectocele, female    5. Vaginal vault prolapse    6. Vaginal atrophy      Postoperative Diagnosis:  1. Urinary, incontinence, stress female    2. Vaginal vault prolapse, posthysterectomy    3. Cystocele, midline    4. Rectocele, female    5. Vaginal vault prolapse    6. Vaginal atrophy      Anesthesia:  General endotracheal anesthesia.  Additionally, 0.5% marcaine was injected prior to placement of each laparoscopic trocar, and a dilute solution of 1% lidocaine with epinephrine was injected vaginally for local anesthesia.    Specimen (Bacteriological, Pathological or other):  Bilateral fallopian tubes and ovaries    Prosthetic Device/Implant:  1)  Gynecare gynemesh (10 x 15 cm).  LOT# [id number].      Surgeons Narrative:    Surgeon: Jordana Conley MD    Assistants:  Ciara Rider MD (PGY4).  Suni Panchal NP (insufficient resident assistance)     Intravenous Fluids:  1300 mL     Estimated Blood Loss:  25 mL     Urine Output:  200 mL     Counts:  Sponge, lap, needle counts correct x 2.     Drains: Casper catheter.     Disposition:  The patient was sent to the PACU in stable condition.     Findings:     1.  On exam under anesthesia,  normal external female genitalia. There was prolapse as previously noted in clinic.  Uterus and cervix: Absent  Adnexa: non palpable.     2.  On laparoscopic survey:    --The bowel was grossly Normal.    --The appendix was not visualized.   --The uterus and cervix were absent.  Bilateral fallopian tubes and ovaries were grossly normal.   --The liver margin Normal.   --The gall bladder was present and Normal.   --Bilateral ureters were visualized and noted to vermiculate at the start and close of the case.    --Adhesions were absent.    --Other findings included:  none     3.  On cystoscopy, the bladder mucosa was Normal.  After BSO/ASC, there was no suture or mesh within the bladder mucosa.  The ureteral orifices were  visualized bilaterally with (+) noted good efflux x 2. On a systematic survey of the bladder dome to the base of the urethrovesical junction, there were not other abnormalities noted. The urethra was normal on retraction of the scope. After filling the bladder to at least 300 mL, there was NEG leakage of fluid from the urethral meatus with gentle Crede.  Therefore, consistent with previous bladder testing, midurethral sling was not placed.      4.  Rectal exam:  At the close of the case, rectal exam was performed.  There was no suture, mesh, or other injury noted on exam.  No obvious masses were palpated.     Description of procedure:    The patient was identified in the preoperative area where informed consent was confirmed, and she was taken to the operating room where an adequate level of general anesthesia was obtained.  The patient was positioned in lithotomy position with legs in Kulwinder stirrups. Care was taken to avoid joint hyperflexion or hyperextension, and all extremity surfaces were carefully padded so as to minimize risk of neurologic injury. Intravenous antibiotics were administered preoperatively. Sequential compression devices were applied to the patient's lower extremities preoperatively and heparin was administered subcutaneously for VTE prophylaxis.  Surgical time-out was performed, where the patient was identified and procedures confirmed.  An examination under anesthesia was performed with findings described as above.  The patient's abdomen, perineum, and vagina were sterilely prepped and draped. A bryant catheter was placed in the bladder for drainage.      First, we placed laparoscopic ports. Before placement of each laparoscopic port, several mL of 0.25% Marcaine were injected subcutaneously as well as deeply into the tissue of each site.  First, a supraumbilical incision of 10-mm was made, and the 10/12mm trocar was inserted into the incision until peritoneal entry was gained and confirmed.  Carbon dioxide was insufflated through the port until an adequate pneumoperitoneum of no greater than 15 mm Hg was obtained.  The laparoscopic camera was inserted through this port for visualization of all subsequent port placement.  Two 8 mm ports were place on bilaterally to the supraumbilical port, each approximately 8-10 cm lateral, along the mid clavicular line just below the level of the umbilicus, at least 2 cm cephalad and medial of the anterior superior iliac spine. Each 8mm trocar was inserted under direct visualization without significant trauma.   An 8 mm Airseal port was placed in the right upper quadrant, superior to the umbilicus and midway between the right lower quadrant port and the umbilicus in a triangulated fashion. A third 8 mm port was placed in the left upper quadrant, superior to the umbilicus and midway between the left lower quadrant port and the umbilicus in a triangulated fashion.  There were no complications with these port placements. A total of 5 ports had been placed, including the supraumbilical port for the camera. The robot was then docked.      Next, we performed bilateral salpingo-oophorectomy. The right infundibulopelvic ligament, meso-ovarium, and stabilizing peritoneum were serially isolated, electrodessicated with bipolar electrocautery, and sharply transected, resulting in complete removal of the right fallopian tube and ovary. This process was repeated on the left side, resulting in complete removal of the left fallopian tube and ovary. Bilateral ureters were visualized during this process and avoided. Both fallopian tubes and ovaries were removed from the pelvis and submitted to pathology for further evaluation. All pedicles were evaluated, and gentle electrocautery was used to insure hemostasis.  Excellent hemostasis was noted.     We then proceeded with sacral colpopexy. We gently retracted the sigmoid colon to the patient's left so that we could easily visualize the  sacral promontory.  The peritoneum was opened over the sacral  promontory, and the presacral space was developed.  Care was used to avoid any trauma to the vasculature or nerve supply underlying this space during this process, and excellent hemostasis was noted throughout. Peritoneal dissection was continued over the sacral promontory to provide adequate space for attachment of the GyneMesh. This process was aided with blunt dissection using Kittners. The peritoneum was then opened cadually all the way to the vaginal cuff.  With the EEA sizers in the vagina and in the rectum, the rectovaginal space was demonstrated. The peritoneum overlying this spaced was opened and the space bluntly dissected down to approximately the distal 1/3 of the vagina. Hemostasis was maintained with electrocautery. After retrograde filling the bladder to demonstrate the most cephalad portion of the bladder, the bladder was dissected off the vaginal cuff and anterior vagina.  Again, excellent hemostasis was noted.    Two pieces of Gynemesh each about 4 cm in width x 15 cm in length were cut. These were broadly affixed to the vagina anteriorly and posteriorly with several rows of interrupted sutures of 2-0 PDS, bipin 6 sutures on the posterior vagina and 6 sutures on the anterior vagina. With the EEA sizer deviating the vagina to the right pararectal space, the pieces of mesh were tensioned appropriately and sutured to the anterior ligament overlying the presacral space at bipin the level of S1 and S2 using 2 x 0-Ethibond sutures. Care was taken to make sure that the mesh was not placed under tension. Excellent hemostasis was noted during this process.  Care was used to avoid trauma to the presacral vasculature during this step. Excess mesh was removed. Interrupted stitches of 0-Vicryl was used to close the peritoneum over the sacral promontory and along the entire extent of the GyneMesh to the vaginal cuff.  Excellent hemostasis was noted at  the end of the procedure. All needles and suture pieces were removed from the pelvis laparoscopically throughout the procedure.  Needles, sponge, and lap counts were correct x 2 at the end of the procedure. At the end of the robotic portion of the case, another pelvic survey was completed.  The pelvis was irrigated, all irrigants removed. All operative areas were noted to be hemostatic.      Next, cystourethroscopy was performed.  Findings were as noted above. There were  no other abnormalities noted.  Ureteral orifices were noted to have good efflux bilaterally. After filling the bladder to at least 300 mL, there was NEG leakage of fluid from the urethral meatus with gentle Crede.  Therefore, consistent with previous bladder testing, midurethral sling was not placed.  The bladder was drained, and a Casper catheter was replaced.    At this point, robotic/laparoscopic portion of the procedure was completed. The pelvis was irrigated, and all irrigants were removed. Along all suture lines and planes of dissection, Gilbert was placed to encourage continued hemostasis, and Interceed was placed to discourage future adhesion formation.  All abdominal incisions were < 1 cm, and fascial closure was unnecessary. The abdomen was deflated and all laparoscopic sleeves and other instruments were removed from the abdomen. All laparoscopic skin incisions were closed with subcuticular stitches of 4-0 Monocryl and secured with steri strips and band aids.  Excellent cosmesis and hemostasis was noted.             At this point, vaginal exam revealed excellent support of all walls, and further repairs were not needed.  Rectal exam was normal as noted above.     At the close of the case, all counts were correct x2.  The vagina was irrigated, and all irrigants were removed. The Casper was in place and draining well.  The patient was awakened from general endotracheal anesthesia and was taken to the Recovery Room in stable condition.  She  tolerated the procedure well.    I was present and scrubbed for the entire procedure.

## 2018-08-20 NOTE — BRIEF OP NOTE
[facility]  Brief Operative Note    SUMMARY     Surgery Date: 8/20/2018     Surgeon(s) and Role:     * Jordana Conley MD - Primary     * Ciara Rider MD - Resident - Assisting        Suni Panchal NP - Assisting    Pre-op Diagnosis:  Rectocele [N81.6]  Midline cystocele [N81.11]  Vaginal vault prolapse [N81.9]  PHILIPPE (stress urinary incontinence)    Post-op Diagnosis:  Post-Op Diagnosis Codes:     * Rectocele [N81.6]     * Midline cystocele [N81.11]     * Vaginal vault prolapse [N81.9]     * PHILIPPE (stress urinary incontinence)    Procedure(s) (LRB):  ROBOTIC SACROCOLPOPEXY, ABDOMEN (N/A)  CYSTOSCOPY (N/A)  ROBOTIC SALPINGO-OOPHORECTOMY (Bilateral)    Anesthesia: General    Description of the findings of the procedure:   - Bilateral tubes and ovaries normal  - Cystoscopy performed at completion of the case showed no evidence of bladder injury and good efflux of urine from bilateral ureteral orifices.   - No leakage of urine with Crede under anesthesia. Sling not performed.    Estimated Blood Loss: 25 ml         Specimens:   Specimen (12h ago, onward)    Start     Ordered    Pending  Specimen to Pathology - Surgery  Once     Comments:  1) vaginal cyst2) left fallopian tube & ovary3) right fallopian tube & ovary      Pending        Ciara Rider MD  OBGYN - PGY 4

## 2018-08-21 VITALS
WEIGHT: 147.69 LBS | HEART RATE: 72 BPM | DIASTOLIC BLOOD PRESSURE: 64 MMHG | RESPIRATION RATE: 18 BRPM | OXYGEN SATURATION: 97 % | HEIGHT: 65 IN | BODY MASS INDEX: 24.61 KG/M2 | TEMPERATURE: 98 F | SYSTOLIC BLOOD PRESSURE: 138 MMHG

## 2018-08-21 LAB
BASOPHILS # BLD AUTO: 0.01 K/UL
BASOPHILS NFR BLD: 0.1 %
DIFFERENTIAL METHOD: ABNORMAL
EOSINOPHIL # BLD AUTO: 0 K/UL
EOSINOPHIL NFR BLD: 0 %
ERYTHROCYTE [DISTWIDTH] IN BLOOD BY AUTOMATED COUNT: 12.9 %
ESTIMATED AVG GLUCOSE: 143 MG/DL
HBA1C MFR BLD HPLC: 6.6 %
HCT VFR BLD AUTO: 36.1 %
HGB BLD-MCNC: 11.7 G/DL
LYMPHOCYTES # BLD AUTO: 1.2 K/UL
LYMPHOCYTES NFR BLD: 15.1 %
MCH RBC QN AUTO: 27.7 PG
MCHC RBC AUTO-ENTMCNC: 32.4 G/DL
MCV RBC AUTO: 86 FL
MONOCYTES # BLD AUTO: 0.6 K/UL
MONOCYTES NFR BLD: 8 %
NEUTROPHILS # BLD AUTO: 6.1 K/UL
NEUTROPHILS NFR BLD: 76.6 %
PLATELET # BLD AUTO: 380 K/UL
PMV BLD AUTO: 8.5 FL
POCT GLUCOSE: 128 MG/DL (ref 70–110)
RBC # BLD AUTO: 4.22 M/UL
WBC # BLD AUTO: 8.01 K/UL

## 2018-08-21 PROCEDURE — 25000003 PHARM REV CODE 250: Performed by: STUDENT IN AN ORGANIZED HEALTH CARE EDUCATION/TRAINING PROGRAM

## 2018-08-21 PROCEDURE — 63600175 PHARM REV CODE 636 W HCPCS: Performed by: OBSTETRICS & GYNECOLOGY

## 2018-08-21 PROCEDURE — 25000003 PHARM REV CODE 250: Performed by: OBSTETRICS & GYNECOLOGY

## 2018-08-21 PROCEDURE — 94761 N-INVAS EAR/PLS OXIMETRY MLT: CPT

## 2018-08-21 PROCEDURE — G0378 HOSPITAL OBSERVATION PER HR: HCPCS

## 2018-08-21 PROCEDURE — 36415 COLL VENOUS BLD VENIPUNCTURE: CPT

## 2018-08-21 PROCEDURE — 11000001 HC ACUTE MED/SURG PRIVATE ROOM

## 2018-08-21 PROCEDURE — 83036 HEMOGLOBIN GLYCOSYLATED A1C: CPT

## 2018-08-21 PROCEDURE — 85025 COMPLETE CBC W/AUTO DIFF WBC: CPT

## 2018-08-21 RX ORDER — DOCUSATE SODIUM 100 MG/1
100 CAPSULE, LIQUID FILLED ORAL 2 TIMES DAILY
Qty: 20 CAPSULE | Refills: 0 | COMMUNITY
Start: 2018-08-21 | End: 2018-09-28

## 2018-08-21 RX ORDER — HYDROCODONE BITARTRATE AND ACETAMINOPHEN 5; 325 MG/1; MG/1
1 TABLET ORAL EVERY 4 HOURS PRN
Qty: 30 TABLET | Refills: 0 | Status: SHIPPED | OUTPATIENT
Start: 2018-08-21 | End: 2018-09-28

## 2018-08-21 RX ORDER — IBUPROFEN 600 MG/1
600 TABLET ORAL EVERY 6 HOURS PRN
Qty: 30 TABLET | Refills: 1 | Status: SHIPPED | OUTPATIENT
Start: 2018-08-21 | End: 2018-09-28

## 2018-08-21 RX ADMIN — HYDROCODONE BITARTRATE AND ACETAMINOPHEN 1 TABLET: 10; 325 TABLET ORAL at 11:08

## 2018-08-21 RX ADMIN — HYDROCODONE BITARTRATE AND ACETAMINOPHEN 1 TABLET: 10; 325 TABLET ORAL at 03:08

## 2018-08-21 RX ADMIN — IBUPROFEN 600 MG: 600 TABLET ORAL at 12:08

## 2018-08-21 RX ADMIN — HEPARIN SODIUM 5000 UNITS: 5000 INJECTION, SOLUTION INTRAVENOUS; SUBCUTANEOUS at 03:08

## 2018-08-21 RX ADMIN — IBUPROFEN 600 MG: 600 TABLET ORAL at 11:08

## 2018-08-21 RX ADMIN — PRAVASTATIN SODIUM 40 MG: 20 TABLET ORAL at 08:08

## 2018-08-21 RX ADMIN — IBUPROFEN 600 MG: 600 TABLET ORAL at 06:08

## 2018-08-21 RX ADMIN — HEPARIN SODIUM 5000 UNITS: 5000 INJECTION, SOLUTION INTRAVENOUS; SUBCUTANEOUS at 06:08

## 2018-08-21 RX ADMIN — PANTOPRAZOLE SODIUM 40 MG: 40 TABLET, DELAYED RELEASE ORAL at 08:08

## 2018-08-21 RX ADMIN — HYDROCODONE BITARTRATE AND ACETAMINOPHEN 1 TABLET: 10; 325 TABLET ORAL at 07:08

## 2018-08-21 NOTE — DISCHARGE SUMMARY
Discharge Summary  Gynecology      Admit Date: 2018    Discharge Date and Time: 2018 1455    Attending Physician: Jordana Conley MD    Principal Diagnoses: S/P robot-assisted surgical procedure    Active Hospital Problems    Diagnosis  POA    *S/P robot-assisted BSO/SCP/cysto [Z98.890]  Not Applicable    Vaginal vault prolapse [N81.9]  Yes    Benign essential HTN [I10]  Yes    DM (diabetes mellitus), type 2 [E11.9]  Yes    HLD (hyperlipidemia) [E78.5]  Yes      Resolved Hospital Problems    Diagnosis Date Resolved POA    Vaginal vault prolapse [N81.9] 2018 Yes       Procedures: Procedure(s) (LRB):  ROBOTIC SACROCOLPOPEXY, ABDOMEN (N/A)  CYSTOSCOPY (N/A)  ROBOTIC SALPINGO-OOPHORECTOMY (Bilateral)    Discharged Condition: good    Hospital Course:   Radha Minaya is a 62 y.o. y.o.  female who presented on 2018   for above procedures for the treatment of POP. Patient tolerated the procedure well without complications, please see Op note for further details. Postoperative course was uncomplicated, and patient made routine advances as anticipated. On POD#1, patient is meeting all postoperative milestones and is ready for discharge. Pain is well-controlled with PO pain medications. Patient is tolerating PO without nausea or vomiting, ambulating and urinating without difficulty. Patient instructed to continue home medications and colace as indicated as well as pain medications as needed, and to follow up with Dr. Conley in 6 weeks for postoperative visit.      Consults: None    Significant Diagnostic Studies:  Recent Labs   Lab  18   0428   WBC  8.01   HGB  11.7*   HCT  36.1*   MCV  86   PLT  380*        Disposition: Home or Self Care    Patient Instructions:   Current Discharge Medication List      START taking these medications    Details   docusate sodium (COLACE) 100 MG capsule Take 1 capsule (100 mg total) by mouth 2 (two) times daily.  Refills: 0       HYDROcodone-acetaminophen (NORCO) 5-325 mg per tablet Take 1 tablet by mouth every 4 (four) hours as needed for Pain.  Qty: 30 tablet, Refills: 0      ibuprofen (ADVIL,MOTRIN) 600 MG tablet Take 1 tablet (600 mg total) by mouth every 6 (six) hours as needed for Pain.  Qty: 30 tablet, Refills: 1         CONTINUE these medications which have NOT CHANGED    Details   ALPRAZolam (XANAX) 0.5 MG tablet Take 0.5 mg by mouth nightly as needed for Anxiety.      aspirin 81 MG Chew Take 81 mg by mouth once daily.      BIOTIN ORAL Take 10,000 mg by mouth.      docosahexanoic acid/epa (FISH OIL ORAL) Take 1,200 mg by mouth.      esomeprazole (NEXIUM) 40 mg injection once a day      fenofibric acid (FIBRICOR) 135 mg CpDR Take 135 mg by mouth once daily.      lisinopril 10 MG tablet Take 10 mg by mouth once daily.      meclizine (ANTIVERT) 25 mg tablet Take 25 mg by mouth 3 (three) times daily as needed.  Refills: 1      metFORMIN (GLUCOPHAGE) 1000 MG tablet Take 1,000 mg by mouth 2 (two) times daily.  Refills: 1      multivit-min/iron/folic/lutein (CENTRUM SILVER WOMEN ORAL) Take by mouth.      ondansetron (ZOFRAN) 4 MG tablet Take 4 mg by mouth 3 (three) times daily as needed.  Refills: 0      pravastatin (PRAVACHOL) 40 MG tablet Take 40 mg by mouth once daily.             Discharge Procedure Orders   Diet diabetic     Notify your health care provider if you experience any of the following:  temperature >100.4     Notify your health care provider if you experience any of the following:  persistent nausea and vomiting or diarrhea     Notify your health care provider if you experience any of the following:  severe uncontrolled pain     Notify your health care provider if you experience any of the following:  redness, tenderness, or signs of infection (pain, swelling, redness, odor or green/yellow discharge around incision site)     Notify your health care provider if you experience any of the following:  difficulty breathing or  increased cough     Notify your health care provider if you experience any of the following:  persistent dizziness, light-headedness, or visual disturbances     No dressing needed   Order Comments: Wash incisions with soap/water daily, dry completely. Remove steri-strips 1 week from surgery     Activity as tolerated       Follow-up Information     Yadira Umaña NP On 10/3/2018.    Specialties:  Gynecology, Urology  Why:  postoperative visit as scheduled  Contact information:  [address] [place] [address]  [phone]                   Ciara Rider MD  OBGYN - PGY 4

## 2018-08-21 NOTE — NURSING
Voiding trial started, placed 200ccs in bladder then removed bryant catheter. Pt told to notify nursing when voids.

## 2018-08-21 NOTE — PROGRESS NOTES
Progress Note  Gynecology    Admit Date: 2018  LOS: 1    Reason for Admission:  S/P robot-assisted surgical procedure    SUBJECTIVE:     Radha Minaya is a 62 y.o.  F with PMHx of HTN, DM2 who is POD#1 s/p RA BSO/SCP/cysto for POP and family h/o ovarian cancer.     Patient seen this AM, no acute issues. Pain is adequately controlled with PO and IV medications. Tolerating regular diet without nausea or vomiting. Has not ambulated or voided yet as bryant catheter in place. Denies flatus or BM. No other complaints at this time.      OBJECTIVE:     Vital Signs   Temp:  [97.4 °F (36.3 °C)-99 °F (37.2 °C)] 98.3 °F (36.8 °C)  Pulse:  [66-90] 73  Resp:  [16-18] 16  SpO2:  [94 %-99 %] 95 %  BP: (121-164)/(58-80) 131/62      Intake/Output Summary (Last 24 hours) at 2018 0606  Last data filed at 2018 1943  Gross per 24 hour   Intake 2300 ml   Output 1000 ml   Net 1300 ml       Physical Exam:  Gen: A&Ox3, NAD  CV: RRR  Pulm: normal respiratory effort  Abd: soft, non-distended, moderately tender to palpation without rebound or guarding, normoactive bowel sounds  Inc:  c/d/i, bandages and steris in place  Ext: no peripheral edema, TEDs in place    Laboratory:  Recent Labs   Lab  18   0428   WBC  8.01   HGB  11.7*   HCT  36.1*   MCV  86   PLT  380*      Blood Sugars (AccuCheck):  Recent Labs      18   1012  18   1845  18   2119   POCTGLUCOSE  115*  144*  155*       Diagnostic Results:  None new    ASSESSMENT/PLAN:     Active Hospital Problems    Diagnosis  POA    *S/P robot-assisted BSO/SCP/cysto [Z98.890]  Not Applicable    Vaginal vault prolapse [N81.9]  Yes    Benign essential HTN [I10]  Yes    DM (diabetes mellitus), type 2 [E11.9]  Yes    HLD (hyperlipidemia) [E78.5]  Yes      Resolved Hospital Problems    Diagnosis Date Resolved POA    Vaginal vault prolapse [N81.9] 2018 Yes       Assessment: 62 y.o.  F with PMHx of HTN, DM2 who is POD#1 s/p RA BSO/SCP/cysto,  doing well    Plan:   1. Postoperative care  - Pain control with scheduled ibuprofen, norco prn and IV dilaudid for breakthrough pain  - Regular diabetic diet  - Antiemetics, simethicone prn. Colace BID.  - UOP adequate overnight. Remove bryant this AM, passive voiding trial  - Encourage ambulation and IS use  - TEDs/SCDs, heparin subQ for DVT ppx  - H/H 12/36 postop    2. HTN  - hold home lisinopril, resume on DC    3. DM2  - hold home metformin, can resume on DC  - Accuchecks ACHS  - SSI    4. GERD  - protonix daily    Dispo: As patient meets postoperative milestones, anticipate discharge home later today POD#1    Ciara Rider MD  OBGYN - PGY 4

## 2018-08-21 NOTE — NURSING
Assumed pt care at this time. Pt voided 50 ml; bladder scan showed 0 ml. Pt ambulated to bathroom independently. Pt denies nausea. Pt reports tolerating meals. Pt encouraged to perform bronchial hygiene measures every two hours while awake. Pt had no heavy bleeding. Pt given TLH sheet. Pt and family given verbal and written discharge instructions, pt and family verbalized understanding to all discharge instructions including the need to attend follow up appointments. Pt pain medication delivered to pt room from outpatient pharmacy.

## 2018-08-21 NOTE — PLAN OF CARE
Initial Discharge Planning Assesment:  Patient admitted on 8/20/2018     Chart reviewed, Care plan discussed. Discussed care plan with treatment team,  attending Dr Conley.    PCP updated in [facility]: Ricki Polanco  Pharmacy, updated in [facility]: [facility] in [place]    DME at home: None    Current dispo Home  Transportation: Family to drive home.    Case management  to follow.  No anticipated needs from CM perspective.       08/21/18 1000   Discharge Assessment   Assessment Type Discharge Planning Assessment   Confirmed/corrected address and phone number on facesheet? Yes   Assessment information obtained from? Patient   Communicated expected length of stay with patient/caregiver yes   Prior to hospitilization cognitive status: Alert/Oriented   Prior to hospitalization functional status: Independent   Current cognitive status: Alert/Oriented   Current Functional Status: Independent   Lives With spouse   Able to Return to Prior Arrangements yes   Is patient able to care for self after discharge? Yes   Who are your caregiver(s) and their phone number(s)? Spouse: Will [phone]   Patient's perception of discharge disposition home or selfcare   Readmission Within The Last 30 Days no previous admission in last 30 days   Patient currently being followed by outpatient case management? No   Patient currently receives any other outside agency services? No   Equipment Currently Used at Home none   Do you have any problems affording any of your prescribed medications? No   Is the patient taking medications as prescribed? yes   Does the patient have transportation home? Yes   Transportation Available family or friend will provide   Does the patient receive services at the [facility]? No   Discharge Plan A Home with family   Discharge Plan B Home   Patient/Family In Agreement With Plan yes

## 2018-08-21 NOTE — NURSING
Voided 250 in hat in bathroom spontaneously without complications. Denies needs. Cb I nreach. Voiding trial complete.

## 2018-08-21 NOTE — NURSING
Assumed care of patient at this time.  Denies c/os or requests.  Pt states she is voiding without difficulty.  VS stable.  Instructed to call as needed.

## 2018-08-21 NOTE — PLAN OF CARE
Pt has transportation home.    No needs from CM perspective.       08/21/18 1113   Final Note   Assessment Type Final Discharge Note   Discharge Disposition Home   What phone number can be called within the next 1-3 days to see how you are doing after discharge? [phone]   Hospital Follow Up  Appt(s) scheduled? Yes   Discharge plans and expectations educations in teach back method with documentation complete? Yes   Right Care Referral Info   Post Acute Recommendation No Care

## 2018-08-21 NOTE — ANESTHESIA POSTPROCEDURE EVALUATION
"Anesthesia Post Evaluation    Patient: Radha Minaya    Procedure(s) Performed: Procedure(s) (LRB):  ROBOTIC SACROCOLPOPEXY, ABDOMEN (N/A)  CYSTOSCOPY (N/A)  ROBOTIC SALPINGO-OOPHORECTOMY (Bilateral)    Final Anesthesia Type: general  Patient location during evaluation: PACU  Patient participation: Yes- Able to Participate  Level of consciousness: awake and alert  Post-procedure vital signs: reviewed and stable  Pain management: adequate  Airway patency: patent  PONV status at discharge: No PONV  Anesthetic complications: no      Cardiovascular status: hemodynamically stable  Respiratory status: unassisted  Hydration status: euvolemic  Follow-up not needed.        Visit Vitals  BP (!) 150/69   Pulse 72   Temp 36.4 °C (97.5 °F) (Oral)   Resp 18   Ht 5' 5" (1.651 m)   Wt 67.1 kg (147 lb 16 oz)   SpO2 98%   Breastfeeding? No   BMI 24.63 kg/m²       Pain/Maggie Score: Pain Assessment Performed: Yes (8/20/2018  9:56 AM)  Presence of Pain: complains of pain/discomfort (8/20/2018  6:55 PM)  Pain Rating Prior to Med Admin: 5 (8/20/2018  6:55 PM)  Maggie Score: 9 (8/20/2018  6:55 PM)        "

## 2018-08-21 NOTE — PROGRESS NOTES
New order to change PRN dilaudid from severe for breakthrough pain. Will give Dilaudid per MD orders.

## 2018-08-21 NOTE — PLAN OF CARE
Problem: Patient Care Overview  Goal: Plan of Care Review  Outcome: Ongoing (interventions implemented as appropriate)  Alert and oriented. Complaint of pain. Pain managed with PO and IV medications. Requested to have left hand IV removed due to discomfort. IV removed and new IV inserted to right wrist. Tolerated well. Bowel sounds present. Bandage to lap sites intact with no drainage noted. Catheter remains intact draining yellow urine.

## 2018-08-24 ENCOUNTER — TELEPHONE (OUTPATIENT)
Dept: UROGYNECOLOGY | Facility: CLINIC | Age: 63
End: 2018-08-24

## 2018-08-24 NOTE — TELEPHONE ENCOUNTER
Spoke with pt an relayed message that : Please let patient know that ovaries we removed were benign--nothing bad happening. Asked pt who she is feeling post surgery, pt states that she feel fine pain has subsided she has two areas on her abdomen that are tender to the touch but nothing to bad. Informed pt I will forward this information to the doctor she voiced understanding and call was ended.

## 2018-08-24 NOTE — TELEPHONE ENCOUNTER
----- Message from Jordana Conley MD sent at 8/23/2018 10:09 PM CDT -----  Please let patient know that ovaries we removed were benign--nothing bad happening.  IS she doing ok postop?  Any issues?  Thanks!

## 2018-08-27 ENCOUNTER — TELEPHONE (OUTPATIENT)
Dept: UROGYNECOLOGY | Facility: CLINIC | Age: 63
End: 2018-08-27

## 2018-09-28 ENCOUNTER — OFFICE VISIT (OUTPATIENT)
Dept: OBSTETRICS AND GYNECOLOGY | Facility: CLINIC | Age: 63
End: 2018-09-28
Payer: COMMERCIAL

## 2018-09-28 VITALS
DIASTOLIC BLOOD PRESSURE: 70 MMHG | BODY MASS INDEX: 24.61 KG/M2 | WEIGHT: 147.69 LBS | HEIGHT: 65 IN | SYSTOLIC BLOOD PRESSURE: 142 MMHG

## 2018-09-28 DIAGNOSIS — Z01.419 ENCOUNTER FOR GYNECOLOGICAL EXAMINATION: Primary | ICD-10-CM

## 2018-09-28 PROCEDURE — 99396 PREV VISIT EST AGE 40-64: CPT | Mod: S$GLB,,, | Performed by: OBSTETRICS & GYNECOLOGY

## 2018-09-28 PROCEDURE — 3078F DIAST BP <80 MM HG: CPT | Mod: CPTII,S$GLB,, | Performed by: OBSTETRICS & GYNECOLOGY

## 2018-09-28 PROCEDURE — 3077F SYST BP >= 140 MM HG: CPT | Mod: CPTII,S$GLB,, | Performed by: OBSTETRICS & GYNECOLOGY

## 2018-09-28 PROCEDURE — 99999 PR PBB SHADOW E&M-EST. PATIENT-LVL III: CPT | Mod: PBBFAC,,, | Performed by: OBSTETRICS & GYNECOLOGY

## 2018-09-28 RX ORDER — ESOMEPRAZOLE MAGNESIUM 40 MG/1
40 CAPSULE, DELAYED RELEASE ORAL DAILY
Refills: 1 | COMMUNITY
Start: 2018-08-18

## 2018-10-01 NOTE — PROGRESS NOTES
CC: Well woman exam    Radha Minaya is a 62 y.o. female  presents for a well woman exam.  Is s/p robotic assisted sacrocolpopexy with BSO.  No c/o.  Has to remember to void but denies urinary retention. Sees urogyn next week.     Past Medical History:   Diagnosis Date    Carotid artery stenosis     Diabetes mellitus     History of radiation therapy     Laryngeal Cancer     Hx of bone density study     Normal, per pt    Hx of laryngeal cancer     Treated with Radiation    Hyperlipemia     Hypertension     Status post hysterectomy     TVH      Past Surgical History:   Procedure Laterality Date    COLONOSCOPY      Normal     CYSTOSCOPY N/A 2018    Procedure: CYSTOSCOPY;  Surgeon: Jordana Conley MD;  Location: [facility] OR;  Service: OB/GYN;  Laterality: N/A;    CYSTOSCOPY N/A 2018    Performed by Jordana Conley MD at [facility] OR    HYSTERECTOMY  1982    TVH after last delivery    KNEE ARTHROSCOPY Right     ROBOT-ASSISTED LAPAROSCOPIC ABDOMINAL SACROCOLPOPEXY N/A 2018    Procedure: ROBOTIC SACROCOLPOPEXY, ABDOMEN;  Surgeon: Jordana Conley MD;  Location: [facility] OR;  Service: OB/GYN;  Laterality: N/A;    ROBOT-ASSISTED LAPAROSCOPIC SALPINGO-OOPHORECTOMY Bilateral 2018    Procedure: ROBOTIC SALPINGO-OOPHORECTOMY;  Surgeon: Jordana Conley MD;  Location: [facility] OR;  Service: OB/GYN;  Laterality: Bilateral;    ROBOTIC SACROCOLPOPEXY, ABDOMEN N/A 2018    Performed by Jordana Conley MD at [facility] OR    ROBOTIC SALPINGO-OOPHORECTOMY Bilateral 2018    Performed by Jordana Conley MD at [facility] OR    THROAT SURGERY      Excision and XRT  - Laryngeal Cancer     TONSILLECTOMY       Family History   Problem Relation Age of Onset    Breast cancer Mother 52    Cancer Mother     Sinus disease Brother         Cancer     Cancer Brother     Ovarian cancer Cousin     Cancer Cousin     Cancer Maternal Aunt     Ovarian cancer Maternal Aunt     Suicide Brother  "    Colon cancer Neg Hx      Social History     Tobacco Use    Smoking status: Former Smoker     Last attempt to quit: 2013     Years since quittin.7    Smokeless tobacco: Never Used   Substance Use Topics    Alcohol use: No    Drug use: No     OB History      Para Term  AB Living    3 3 3 0 0 3    SAB TAB Ectopic Multiple Live Births    0 0 0 0 3        Obstetric Comments    Menarche 16          BP (!) 142/70   Ht 5' 5" (1.651 m)   Wt 67 kg (147 lb 11.3 oz)   BMI 24.58 kg/m²     ROS:  GENERAL: Denies weight gain or weight loss. Feeling well overall.   SKIN: Denies rash or lesions.   HEAD: Denies head injury or headache.   NODES: Denies enlarged lymph nodes.   CHEST: Denies chest pain or shortness of breath.   CARDIOVASCULAR: Denies palpitations or left sided chest pain.   ABDOMEN: No abdominal pain, constipation, diarrhea, nausea, vomiting or rectal bleeding.   URINARY: No frequency, dysuria, hematuria, or burning on urination.  REPRODUCTIVE: See HPI.   BREASTS: The patient performs breast self-examination and denies pain, lumps, or nipple discharge.   HEMATOLOGIC: No easy bruisability or excessive bleeding.   MUSCULOSKELETAL: Denies joint pain or swelling.   NEUROLOGIC: Denies syncope or weakness.   PSYCHIATRIC: Denies depression, anxiety or mood swings.    PE:   APPEARANCE: Well nourished, well developed, in no acute distress.  AFFECT: WNL, alert and oriented x 3.  SKIN: No acne or hirsutism.  NECK: Neck symmetric without masses or thyromegaly.  NODES: No inguinal, cervical, axillary or femoral lymph node enlargement.  CHEST: Good respiratory effort.   ABDOMEN: Soft. No tenderness or masses. No hepatosplenomegaly. No hernias.  BREASTS: Symmetrical, no skin changes or visible lesions. No palpable masses, nipple discharge bilaterally.  PELVIC: Normal external female genitalia without lesions. Normal hair distribution. Adequate perineal body, normal urethral meatus. Vagina atrophic without " lesions or discharge. No significant cystocele or rectocele. Bimanual exam shows uterus and cervix to be surgically absent. Adnexa without masses or tenderness.  RECTAL: Rectovaginal exam confirms above with normal sphincter tone, no masses.  EXTREMITIES: No edema.      ICD-10-CM ICD-9-CM    1. Encounter for gynecological examination Z01.419 V72.31            Patient was counseled today on A.C.S. Pap guidelines and recommendations for yearly pelvic exams, mammograms and monthly self breast exams; to see her PCP for other health maintenance.     Follow-up in about 1 year (around 9/28/2019).

## 2018-10-03 ENCOUNTER — OFFICE VISIT (OUTPATIENT)
Dept: UROGYNECOLOGY | Facility: CLINIC | Age: 63
End: 2018-10-03
Payer: COMMERCIAL

## 2018-10-03 VITALS
BODY MASS INDEX: 24.68 KG/M2 | HEIGHT: 65 IN | WEIGHT: 148.13 LBS | SYSTOLIC BLOOD PRESSURE: 120 MMHG | DIASTOLIC BLOOD PRESSURE: 60 MMHG

## 2018-10-03 DIAGNOSIS — E11.65 TYPE 2 DIABETES MELLITUS WITH HYPERGLYCEMIA, WITHOUT LONG-TERM CURRENT USE OF INSULIN: ICD-10-CM

## 2018-10-03 DIAGNOSIS — N39.41 URGE URINARY INCONTINENCE: ICD-10-CM

## 2018-10-03 DIAGNOSIS — Z98.890 S/P ROBOT-ASSISTED SURGICAL PROCEDURE: ICD-10-CM

## 2018-10-03 DIAGNOSIS — N95.2 VAGINAL ATROPHY: Primary | ICD-10-CM

## 2018-10-03 DIAGNOSIS — K59.09 CHRONIC CONSTIPATION: ICD-10-CM

## 2018-10-03 PROBLEM — N81.9 VAGINAL VAULT PROLAPSE: Status: RESOLVED | Noted: 2018-08-20 | Resolved: 2018-10-03

## 2018-10-03 PROBLEM — N99.3 VAGINAL VAULT PROLAPSE, POSTHYSTERECTOMY: Status: RESOLVED | Noted: 2018-07-08 | Resolved: 2018-10-03

## 2018-10-03 PROCEDURE — 99999 PR PBB SHADOW E&M-EST. PATIENT-LVL III: CPT | Mod: PBBFAC,,, | Performed by: OBSTETRICS & GYNECOLOGY

## 2018-10-03 PROCEDURE — 99024 POSTOP FOLLOW-UP VISIT: CPT | Mod: S$GLB,,, | Performed by: OBSTETRICS & GYNECOLOGY

## 2018-10-03 RX ORDER — ESTRADIOL 0.1 MG/G
0.5 CREAM VAGINAL DAILY
Qty: 42.5 G | Refills: 11 | Status: SHIPPED | OUTPATIENT
Start: 2018-10-03 | End: 2019-10-15 | Stop reason: SDUPTHER

## 2018-10-03 NOTE — PROGRESS NOTES
Urogyn follow up  10/03/2018    [facility] [address] [place] [address]    Radha Minaya  [id number]  1955      Radha Minaya is a 62 y.o. here for a urogyn follow up.    8/21/18  Title of Operation:  1)  Robotic-assisted laparoscopic bilateral salpingo-oophorectomy   2)  Robotic-assisted laparoscopic sacral colpopexy with polypropylene mesh  3)  Cystourethroscopy     Indications for Surgery:  1)  UI:  (+) PHILIPPE. (--) UUI  X 1-2 years.  (+) pads (liners):2/day, usually minimum wetness and 1/night usually minimum wetness.  Daytime frequency: Q 4 hours.  Nocturia: Yes:0- 1/night.   (--) dysuria,  (--) hematuria,  (--) frequent UTIs.  (--) complete bladder emptying. PV to help with variable results.   --8/9/18 UDS:  3.  URETHRAL FUNCTION/STORAGE PHASE:   a.  WITH prolapse reduction:  · CLPP (150 mL): Negative  at  123 cm H20  · VLPP (150 mL): Negative  at  86 cm H20   · CLPP (300 mL): Negative  at  111 cm H20  · VLPP (300 mL): Negative  at  77 cm H20   · CLPP (450 mL ):    Negative  at  140 cm H20  · VLPP (450 mL):     Negative  at  98 cm H20  · CLPP (MAX ):    Negative  at  155 cm H20  · VLPP (MAX):     Negative  at  117 cm H20  · NEG CLPP and VLPP at max cap with pves catheter removed     These findings are consistent with Negative urodynamic stress incontinence.  Cysto  Findings: Urethroscopy:  Normal.  Cystoscopy:  Normal bladder mucosa, bilateral ureteral flow was noted.   Assessment: Essentially normal cystourethroscopy     2)  POP:  Present x 2 years--saw GYN and was told was mild, now worsening. Below introitus, about egg-sized.  Symptoms:(+)  RLQ with bulge, low back pain, difficult urination, pressure.  (--) vaginal bleeding. (--) vaginal discharge. (--) sexually active due to POP.  (--) h/o dysparenia. (--)  Vaginal dryness.  (--) vaginal estrogen use.    POP-Q:  Aa +3; Ba +3; C -6; Ap 0; Bp 0.  Genital hiatus 5, perineal body 2, total vaginal length  "11.     3)  BM:  (--) constipation/straining.  (--) chronic diarrhea. (--) hematochezia.  (--) fecal incontinence.  (--) fecal smearing/urgency.  (+) complete evacuation.      Preoperative Diagnosis:  1. Urinary, incontinence, stress female    2. Vaginal vault prolapse, posthysterectomy    3. Cystocele, midline    4. Rectocele, female    5. Vaginal vault prolapse    6. Vaginal atrophy       Postoperative Diagnosis:  1. Urinary, incontinence, stress female    2. Vaginal vault prolapse, posthysterectomy    3. Cystocele, midline    4. Rectocele, female    5. Vaginal vault prolapse    6. Vaginal atrophy      Issues include:  Patient Active Problem List   Diagnosis    Benign essential HTN    DM (diabetes mellitus), type 2    HLD (hyperlipidemia)    Nausea and vomiting    Fever    Abdominal pain    Hypertension associated with diabetes    Urinary, incontinence, stress female    Rectocele, female    Cystocele, midline    Vaginal atrophy    S/P robot-assisted BSO/SCP/cysto    Malignant neoplasm of larynx    Stenosis of carotid artery    Hyperlipidemia    Urge urinary incontinence    Chronic constipation     History since last visit: thinks had a mild infection in RLQ LSC incision--cleaned and improved now; no major pain;  No VB; +mild yellow discharge--stable.    Bladder issues: No major UI unless holds too long.  Is doing timed voids Q3h as has some decreased sense of urge.  Wears pad "in case."  +complete emptying.  No dysuria.   Bowel issues: mild straining.  Taking fiber supplements (1 tsp) in glass of H20 daily.  No stool softener anymore.     Medications:    Current Outpatient Medications:     ALPRAZolam (XANAX) 0.5 MG tablet, Take 0.5 mg by mouth nightly as needed for Anxiety., Disp: , Rfl:     aspirin 81 MG Chew, Take 81 mg by mouth once daily., Disp: , Rfl:     BIOTIN ORAL, Take 10,000 mg by mouth., Disp: , Rfl:     docosahexanoic acid/epa (FISH OIL ORAL), Take 1,200 mg by mouth., Disp: , Rfl: " "    esomeprazole (NEXIUM) 40 MG capsule, Take 40 mg by mouth once daily., Disp: , Rfl: 1    fenofibric acid (FIBRICOR) 135 mg CpDR, Take 135 mg by mouth once daily., Disp: , Rfl:     lisinopril 10 MG tablet, Take 10 mg by mouth once daily., Disp: , Rfl:     meclizine (ANTIVERT) 25 mg tablet, Take 25 mg by mouth 3 (three) times daily as needed., Disp: , Rfl: 1    metFORMIN (GLUCOPHAGE) 1000 MG tablet, Take 1,000 mg by mouth 2 (two) times daily., Disp: , Rfl: 1    multivit-min/iron/folic/lutein (CENTRUM SILVER WOMEN ORAL), Take by mouth., Disp: , Rfl:     ondansetron (ZOFRAN) 4 MG tablet, Take 4 mg by mouth 3 (three) times daily as needed., Disp: , Rfl: 0    pravastatin (PRAVACHOL) 40 MG tablet, Take 40 mg by mouth once daily., Disp: , Rfl:     estradiol (ESTRACE) 0.01 % (0.1 mg/gram) vaginal cream, Place 0.5 g vaginally once daily., Disp: 42.5 g, Rfl: 11    ROS:  As per HPI.      Exam  /60 (BP Location: Right arm, Patient Position: Sitting, BP Method: Medium (Manual))   Ht 5' 5" (1.651 m)   Wt 67.2 kg (148 lb 2.4 oz)   BMI 24.65 kg/m²   General: alert and oriented, no acute distress  Respiratory: normal respiratory effort  Abd: soft, non-tender, non-distended    Pelvic  Ext. Genitalia: normal external genitalia. Normal bartholin's and skenes glands  Vagina: min atrophy. Normal vaginal mucosa without lesions. No discharge noted.   Non-tender bladder base without palpable mass. No mesh visible/palpable.    Cervix: absent  Uterus:  surgically absent, vaginal cuff well healed   Urethra: no masses or tenderness  Urethral meatus: no lesions, caruncle or prolapse.    POP-Q: deferred. No prolapse with valsalva.     Impression  1. Vaginal atrophy  estradiol (ESTRACE) 0.01 % (0.1 mg/gram) vaginal cream   2. Type 2 diabetes mellitus with hyperglycemia, without long-term current use of insulin     3. S/P robot-assisted BSO/SCP/cysto     4. Urge urinary incontinence     5. Chronic constipation       We reviewed " the above issues and discussed options for short-term versus long-term management of her problems.   Plan:   1. Postop state: well-healed.  Resume normal activities.   2. Bowel movements:  Increase fiber to 2-3 tsps/day in large glass of water.  GOAL:  To minimize straining with bowel movements.  Will keep prolapse from recurring.   Occasional urinary incontinence:  Seems most related to holding bladder too long.  Empty bladder every 3 hours.  Empty well: wait a minute, lean forward on toilet.  Control diabetes.   Vaginal health/dryness:  Start vaginal estrogen cream 0.5 g in vagina 2x/week.  This will keep surgery sites healthy/prevent mesh issues and can reduce bladder urgency/frequency and UTI rate.    3. She will follow up with us in Jan for 6 month check.   30 minutes were spent in face to face time with this patient  90 % of this time was spent in counseling and/or coordination of care     Jordana Conley MD  [facility]  Division of Female Pelvic Medicine and Reconstructive Surgery  Department of Obstetrics & Gynecology

## 2019-07-26 ENCOUNTER — TELEPHONE (OUTPATIENT)
Dept: OBSTETRICS AND GYNECOLOGY | Facility: CLINIC | Age: 64
End: 2019-07-26

## 2019-07-26 DIAGNOSIS — Z12.31 ENCOUNTER FOR SCREENING MAMMOGRAM FOR MALIGNANT NEOPLASM OF BREAST: Primary | ICD-10-CM

## 2019-07-26 NOTE — TELEPHONE ENCOUNTER
Pt has mammogram and annual scheduled on 10/15 and needs mammogram order linked to her appt. Please link mammogram screening order to her scheduled appt, thank you!

## 2019-10-15 ENCOUNTER — OFFICE VISIT (OUTPATIENT)
Dept: OBSTETRICS AND GYNECOLOGY | Facility: CLINIC | Age: 64
End: 2019-10-15
Payer: COMMERCIAL

## 2019-10-15 ENCOUNTER — APPOINTMENT (OUTPATIENT)
Dept: RADIOLOGY | Facility: OTHER | Age: 64
End: 2019-10-15
Attending: OBSTETRICS & GYNECOLOGY
Payer: COMMERCIAL

## 2019-10-15 VITALS
HEIGHT: 65 IN | SYSTOLIC BLOOD PRESSURE: 120 MMHG | DIASTOLIC BLOOD PRESSURE: 70 MMHG | WEIGHT: 151.25 LBS | BODY MASS INDEX: 25.2 KG/M2

## 2019-10-15 DIAGNOSIS — Z01.419 ENCOUNTER FOR GYNECOLOGICAL EXAMINATION: Primary | ICD-10-CM

## 2019-10-15 DIAGNOSIS — N95.2 VAGINAL ATROPHY: ICD-10-CM

## 2019-10-15 DIAGNOSIS — Z12.31 ENCOUNTER FOR SCREENING MAMMOGRAM FOR MALIGNANT NEOPLASM OF BREAST: ICD-10-CM

## 2019-10-15 PROCEDURE — 77063 BREAST TOMOSYNTHESIS BI: CPT | Mod: 26,,, | Performed by: RADIOLOGY

## 2019-10-15 PROCEDURE — 99396 PR PREVENTIVE VISIT,EST,40-64: ICD-10-PCS | Mod: S$GLB,,, | Performed by: OBSTETRICS & GYNECOLOGY

## 2019-10-15 PROCEDURE — 99396 PREV VISIT EST AGE 40-64: CPT | Mod: S$GLB,,, | Performed by: OBSTETRICS & GYNECOLOGY

## 2019-10-15 PROCEDURE — 99999 PR PBB SHADOW E&M-EST. PATIENT-LVL III: CPT | Mod: PBBFAC,,, | Performed by: OBSTETRICS & GYNECOLOGY

## 2019-10-15 PROCEDURE — 3074F PR MOST RECENT SYSTOLIC BLOOD PRESSURE < 130 MM HG: ICD-10-PCS | Mod: CPTII,S$GLB,, | Performed by: OBSTETRICS & GYNECOLOGY

## 2019-10-15 PROCEDURE — 77067 SCR MAMMO BI INCL CAD: CPT | Mod: TC,PN

## 2019-10-15 PROCEDURE — 77067 MAMMO DIGITAL SCREENING BILAT WITH TOMOSYNTHESIS_CAD: ICD-10-PCS | Mod: 26,,, | Performed by: RADIOLOGY

## 2019-10-15 PROCEDURE — 3074F SYST BP LT 130 MM HG: CPT | Mod: CPTII,S$GLB,, | Performed by: OBSTETRICS & GYNECOLOGY

## 2019-10-15 PROCEDURE — 99999 PR PBB SHADOW E&M-EST. PATIENT-LVL III: ICD-10-PCS | Mod: PBBFAC,,, | Performed by: OBSTETRICS & GYNECOLOGY

## 2019-10-15 PROCEDURE — 77063 MAMMO DIGITAL SCREENING BILAT WITH TOMOSYNTHESIS_CAD: ICD-10-PCS | Mod: 26,,, | Performed by: RADIOLOGY

## 2019-10-15 PROCEDURE — 3078F PR MOST RECENT DIASTOLIC BLOOD PRESSURE < 80 MM HG: ICD-10-PCS | Mod: CPTII,S$GLB,, | Performed by: OBSTETRICS & GYNECOLOGY

## 2019-10-15 PROCEDURE — 77067 SCR MAMMO BI INCL CAD: CPT | Mod: 26,,, | Performed by: RADIOLOGY

## 2019-10-15 PROCEDURE — 3078F DIAST BP <80 MM HG: CPT | Mod: CPTII,S$GLB,, | Performed by: OBSTETRICS & GYNECOLOGY

## 2019-10-15 RX ORDER — FENOFIBRIC ACID 105 MG/1
135 TABLET ORAL
COMMUNITY
Start: 2015-04-29

## 2019-10-15 RX ORDER — FENOFIBRATE 145 MG/1
145 TABLET, FILM COATED ORAL DAILY
Refills: 1 | COMMUNITY
Start: 2019-07-17

## 2019-10-15 RX ORDER — METFORMIN HYDROCHLORIDE 500 MG/1
500 TABLET ORAL 2 TIMES DAILY
Refills: 1 | COMMUNITY
Start: 2019-07-17

## 2019-10-15 RX ORDER — ALBUTEROL SULFATE 90 UG/1
AEROSOL, METERED RESPIRATORY (INHALATION)
Refills: 2 | COMMUNITY
Start: 2019-09-16

## 2019-10-15 RX ORDER — LISINOPRIL 40 MG/1
40 TABLET ORAL DAILY
Refills: 1 | COMMUNITY
Start: 2019-07-17

## 2019-10-15 RX ORDER — PRAVASTATIN SODIUM 40 MG/1
TABLET ORAL
COMMUNITY
Start: 2015-04-29

## 2019-10-15 RX ORDER — METFORMIN HYDROCHLORIDE 500 MG/1
TABLET ORAL
COMMUNITY
Start: 2015-04-29

## 2019-10-15 RX ORDER — CLOPIDOGREL BISULFATE 75 MG/1
TABLET ORAL
COMMUNITY
Start: 2015-04-29

## 2019-10-15 RX ORDER — ESTRADIOL 0.1 MG/G
CREAM VAGINAL
Qty: 42.5 G | Refills: 11 | Status: SHIPPED | OUTPATIENT
Start: 2019-10-15
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
## 2021-04-29 ENCOUNTER — PATIENT MESSAGE (OUTPATIENT)
Dept: RESEARCH | Facility: HOSPITAL | Age: 66
End: 2021-04-29

## 2022-03-17 NOTE — PATIENT INSTRUCTIONS
"  How to Control Nausea and Vomiting     Taken before meals, medicines can help ease nausea.    Nausea is feeling that you need to throw up. Throwing up occurs when your body forces food that is in your stomach out through your mouth. Nausea and vomiting are symptoms that are caused by many things. They can happen when a condition or disease, medicine, medical treatment, or a poisonous substance affects the area in your brain that controls vomiting. Some conditions or diseases can cause nausea, abdominal pain or cramps, and vomiting. The symptoms can be mild and go away by themselves. Other symptoms can be serious. You will need to see your healthcare provider for these.  Nausea and vomiting are common. They can be caused by many things. These include:  · "Stomach flu" (gastroenteritis)  · Food poisoning  · Stomach pain (gastritis)  · Blockages  They can also be caused by a head injury, an infection in the brain or inside the ear, or migraines. Other common causes of nausea and vomiting include:  · Brain tumor  · Brain bruise  · Motion sickness  · Drugs. These include alcohol, pain medicines such as morphine, and cancer medicines.  · Toxins. These are poisonous things like plants or liquids that are swallowed by accident.  · Advanced types of cancer  · Movement problems (psychogenic problems)  · Extra pressure in the fluid that surrounds the brain and spinal cord (elevated intracranial pressure)     Nausea and vomiting are also common side effects of chemotherapy and radiation therapy. Side effects happen when treatment changes some normal cells as well as cancer cells. In this case, the cells lining your stomach and the part of your brain that controls vomiting are affected. Other more serious causes of vomiting may be hard to find early in the illness.     When to seek medical advice  Call your healthcare provider right away if you have the following:  · Nausea or vomiting that lasts 24 hours or more  · Trouble " keeping fluids down   Medicines can help  Nausea or vomiting can often be prevented or controlled with medicines (antiemetics). Your doctor may give you antiemetics before or after treatment if you are getting chemotherapy or other medical treatments that cause nausea or vomiting.  Eating tips  · If you have medicines to control nausea, take them before meals as directed.  · Avoid fatty or greasy foods while nauseated.  · Eat small meals slowly throughout the day.  · Ask someone to sit with you while you eat to keep you from thinking about feeling nauseated.  · Eat foods at room temperature or colder to avoid strong smells.  · Eat dry foods, such as toast, crackers, or pretzels. Also eat cool, light foods, such as applesauce, and bland foods, such as oatmeal or skinned chicken.   Other ways to feel better  · Get a little fresh air. Take a short walk.  · Talk to a friend, listen to music, or watch TV.  · Take a few deep, slow breaths.  · Eat by candlelight or in surroundings that you find relaxing.  · Use a technique, such as guided imagery, to help you relax. Imagine yourself in a beautiful, restful scene. Or daydream about the place youd most like to be.  Date Last Reviewed: 1/6/2016  © 2262-3515 [employer]. [address] [place] [address], [place] [address]. All rights reserved. This information is not intended as a substitute for professional medical care. Always follow your healthcare professional's instructions.         Doxycycline Pregnancy And Lactation Text: This medication is Pregnancy Category D and not consider safe during pregnancy. It is also excreted in breast milk but is considered safe for shorter treatment courses.

## 2022-09-08 NOTE — PATIENT INSTRUCTIONS
----- Message from Masha Bryant 1. Postop state: well-healed.  Resume normal activities.   2. Bowel movements:  Increase fiber to 2-3 tsps/day in large glass of water.  GOAL:  To minimize straining with bowel movements.  Will keep prolapse from recurring.   Occasional urinary incontinence:  Seems most related to holding bladder too long.  Empty bladder every 3 hours.  Empty well: wait a minute, lean forward on toilet.  Control diabetes.   Vaginal health/dryness:  Start vaginal estrogen cream 0.5 g in vagina 2x/week.  This will keep surgery sites healthy/prevent mesh issues and can reduce bladder urgency/frequency and UTI rate.    3. She will follow up with us in Jan for 6 month check.

## (undated) DEVICE — POWDER ARISTA AH 3G

## (undated) DEVICE — SET TRI-LUMEN FILTERED TUBE

## (undated) DEVICE — SET CYSTO IRRIGATION UNIV SPIK

## (undated) DEVICE — CLOSURE SKIN STERI STRIP 1/2X4

## (undated) DEVICE — CATH FOL IC 3WAY 16F 5CCLF

## (undated) DEVICE — SOL ELECTROLUBE ANTI-STIC

## (undated) DEVICE — GLOVE BIOGEL SKINSENSE PI 6.5

## (undated) DEVICE — SUT ETHIBOND EXCEL 0 CT2 30

## (undated) DEVICE — DRAPE STERI INSTRUMENT 1018

## (undated) DEVICE — NDL HYPO REG 25G X 1 1/2

## (undated) DEVICE — SYR 0.9% NACL 10ML STERILE

## (undated) DEVICE — POSITIONER HEEL FOAM CONVOLTD

## (undated) DEVICE — KIT ROBOTIC 4 ARM DA VINCI SI

## (undated) DEVICE — BARRIER INTERCEED 3 X 4 ST DIS

## (undated) DEVICE — SUT VICRYL CTD 2-0 GI 27 SH

## (undated) DEVICE — SOL STRL WATER INJ 1000ML BG

## (undated) DEVICE — SEAL CANNULA DA VINCI 12MM

## (undated) DEVICE — DEVICE ANC SW STAT FOLEY 6-24

## (undated) DEVICE — UNDERGLOVE BIOGEL PI SZ 6.5 LF

## (undated) DEVICE — SYR 10CC LUER LOCK

## (undated) DEVICE — ELECTRODE REM PLYHSV RETURN 9

## (undated) DEVICE — SUT PDS II 2-0 CT-2 VIL

## (undated) DEVICE — KIT URINE METER 350ML STAT LOC

## (undated) DEVICE — SUT VICRYL 0 27 CT-2

## (undated) DEVICE — IRRIGATOR ENDOSCOPY DISP.

## (undated) DEVICE — SEE MEDLINE ITEM 156923

## (undated) DEVICE — SEE MEDLINE ITEM 146347

## (undated) DEVICE — UNDERGLOVES BIOGEL PI SZ 7 LF

## (undated) DEVICE — BLADE SURG STAINLESS STEEL #11

## (undated) DEVICE — GLOVE BIOGEL SKINSENSE PI 7.0

## (undated) DEVICE — PAD ABD 8X10 STERILE

## (undated) DEVICE — PORT ACCESS 8MM W/120MM LOW

## (undated) DEVICE — COVER TIP CURVED SCISSORS XI

## (undated) DEVICE — KIT WING PAD POSITIONING

## (undated) DEVICE — SUT MCRYL PLUS 4-0 PS2 27IN

## (undated) DEVICE — DRESSING LEUKOPLAST FLEX 1X3IN

## (undated) DEVICE — TROCAR ENDOPATH XCEL 5X100MM

## (undated) DEVICE — BLADE SURG #15 CARBON STEEL

## (undated) DEVICE — GAUZE PACKING VAG XRAY 2X6

## (undated) DEVICE — SOL 9P NACL IRR PIC IL

## (undated) DEVICE — JELLY KY LUBRICATING 5G PACKET

## (undated) DEVICE — APPLICATOR ARISTA FLEX XL

## (undated) DEVICE — PAD PREP 50/CA

## (undated) DEVICE — SOL NS 1000CC

## (undated) DEVICE — SEE MEDLINE ITEM 156930